# Patient Record
Sex: MALE | Employment: OTHER | URBAN - METROPOLITAN AREA
[De-identification: names, ages, dates, MRNs, and addresses within clinical notes are randomized per-mention and may not be internally consistent; named-entity substitution may affect disease eponyms.]

---

## 2020-08-27 ENCOUNTER — ANESTHESIA (OUTPATIENT)
Dept: SURGERY | Facility: CLINIC | Age: 60
End: 2020-08-27
Payer: MEDICAID

## 2020-08-27 ENCOUNTER — ANESTHESIA EVENT (OUTPATIENT)
Dept: SURGERY | Facility: CLINIC | Age: 60
End: 2020-08-27
Payer: MEDICAID

## 2020-08-27 ENCOUNTER — APPOINTMENT (OUTPATIENT)
Dept: CT IMAGING | Facility: CLINIC | Age: 60
End: 2020-08-27
Attending: EMERGENCY MEDICINE
Payer: MEDICAID

## 2020-08-27 ENCOUNTER — APPOINTMENT (OUTPATIENT)
Dept: GENERAL RADIOLOGY | Facility: CLINIC | Age: 60
End: 2020-08-27
Attending: INTERNAL MEDICINE
Payer: MEDICAID

## 2020-08-27 ENCOUNTER — APPOINTMENT (OUTPATIENT)
Dept: GENERAL RADIOLOGY | Facility: CLINIC | Age: 60
End: 2020-08-27
Attending: EMERGENCY MEDICINE
Payer: MEDICAID

## 2020-08-27 ENCOUNTER — HOSPITAL ENCOUNTER (INPATIENT)
Facility: CLINIC | Age: 60
LOS: 1 days | Discharge: HOME | End: 2020-08-28
Attending: EMERGENCY MEDICINE | Admitting: INTERNAL MEDICINE
Payer: MEDICAID

## 2020-08-27 DIAGNOSIS — N20.0 KIDNEY STONE: ICD-10-CM

## 2020-08-27 DIAGNOSIS — N12 PYELONEPHRITIS: ICD-10-CM

## 2020-08-27 PROBLEM — A41.9 SEPSIS (H): Status: ACTIVE | Noted: 2020-08-27

## 2020-08-27 LAB
ALBUMIN UR-MCNC: 10 MG/DL
ANION GAP SERPL CALCULATED.3IONS-SCNC: 4 MMOL/L (ref 3–14)
APPEARANCE UR: CLEAR
BACTERIA #/AREA URNS HPF: ABNORMAL /HPF
BASOPHILS # BLD AUTO: 0 10E9/L (ref 0–0.2)
BASOPHILS NFR BLD AUTO: 0.1 %
BILIRUB UR QL STRIP: NEGATIVE
BUN SERPL-MCNC: 20 MG/DL (ref 7–30)
CALCIUM SERPL-MCNC: 8.6 MG/DL (ref 8.5–10.1)
CHLORIDE SERPL-SCNC: 111 MMOL/L (ref 94–109)
CO2 SERPL-SCNC: 25 MMOL/L (ref 20–32)
COLOR UR AUTO: YELLOW
CREAT SERPL-MCNC: 1.29 MG/DL (ref 0.66–1.25)
DIFFERENTIAL METHOD BLD: ABNORMAL
EOSINOPHIL # BLD AUTO: 0 10E9/L (ref 0–0.7)
EOSINOPHIL NFR BLD AUTO: 0.2 %
ERYTHROCYTE [DISTWIDTH] IN BLOOD BY AUTOMATED COUNT: 13.9 % (ref 10–15)
GFR SERPL CREATININE-BSD FRML MDRD: 60 ML/MIN/{1.73_M2}
GLUCOSE BLDC GLUCOMTR-MCNC: 222 MG/DL (ref 70–99)
GLUCOSE BLDC GLUCOMTR-MCNC: 292 MG/DL (ref 70–99)
GLUCOSE BLDC GLUCOMTR-MCNC: 88 MG/DL (ref 70–99)
GLUCOSE SERPL-MCNC: 145 MG/DL (ref 70–99)
GLUCOSE UR STRIP-MCNC: NEGATIVE MG/DL
HBA1C MFR BLD: 6.4 % (ref 0–5.6)
HCT VFR BLD AUTO: 42.8 % (ref 40–53)
HGB BLD-MCNC: 14.3 G/DL (ref 13.3–17.7)
HGB UR QL STRIP: ABNORMAL
IMM GRANULOCYTES # BLD: 0 10E9/L (ref 0–0.4)
IMM GRANULOCYTES NFR BLD: 0.2 %
KETONES UR STRIP-MCNC: NEGATIVE MG/DL
LABORATORY COMMENT REPORT: NORMAL
LACTATE BLD-SCNC: 1.1 MMOL/L (ref 0.7–2)
LEUKOCYTE ESTERASE UR QL STRIP: ABNORMAL
LYMPHOCYTES # BLD AUTO: 0.5 10E9/L (ref 0.8–5.3)
LYMPHOCYTES NFR BLD AUTO: 5.9 %
MCH RBC QN AUTO: 31.9 PG (ref 26.5–33)
MCHC RBC AUTO-ENTMCNC: 33.4 G/DL (ref 31.5–36.5)
MCV RBC AUTO: 96 FL (ref 78–100)
MONOCYTES # BLD AUTO: 0.4 10E9/L (ref 0–1.3)
MONOCYTES NFR BLD AUTO: 4.5 %
NEUTROPHILS # BLD AUTO: 7.2 10E9/L (ref 1.6–8.3)
NEUTROPHILS NFR BLD AUTO: 89.1 %
NITRATE UR QL: NEGATIVE
NT-PROBNP SERPL-MCNC: 410 PG/ML (ref 0–900)
PH UR STRIP: 5.5 PH (ref 5–7)
PLATELET # BLD AUTO: 145 10E9/L (ref 150–450)
POTASSIUM SERPL-SCNC: 3.8 MMOL/L (ref 3.4–5.3)
RBC # BLD AUTO: 4.48 10E12/L (ref 4.4–5.9)
RBC #/AREA URNS AUTO: 10 /HPF (ref 0–2)
SARS-COV-2 RNA SPEC QL NAA+PROBE: NEGATIVE
SARS-COV-2 RNA SPEC QL NAA+PROBE: NORMAL
SODIUM SERPL-SCNC: 140 MMOL/L (ref 133–144)
SOURCE: ABNORMAL
SP GR UR STRIP: 1.02 (ref 1–1.03)
SPECIMEN SOURCE: NORMAL
SPECIMEN SOURCE: NORMAL
SQUAMOUS #/AREA URNS AUTO: <1 /HPF (ref 0–1)
UROBILINOGEN UR STRIP-MCNC: NORMAL MG/DL (ref 0–2)
WBC # BLD AUTO: 8.1 10E9/L (ref 4–11)
WBC #/AREA URNS AUTO: 29 /HPF (ref 0–5)

## 2020-08-27 PROCEDURE — 96375 TX/PRO/DX INJ NEW DRUG ADDON: CPT

## 2020-08-27 PROCEDURE — 00000146 ZZHCL STATISTIC GLUCOSE BY METER IP

## 2020-08-27 PROCEDURE — 99222 1ST HOSP IP/OBS MODERATE 55: CPT | Mod: AI | Performed by: INTERNAL MEDICINE

## 2020-08-27 PROCEDURE — 25000125 ZZHC RX 250: Performed by: NURSE ANESTHETIST, CERTIFIED REGISTERED

## 2020-08-27 PROCEDURE — 37000009 ZZH ANESTHESIA TECHNICAL FEE, EACH ADDTL 15 MIN: Performed by: UROLOGY

## 2020-08-27 PROCEDURE — U0003 INFECTIOUS AGENT DETECTION BY NUCLEIC ACID (DNA OR RNA); SEVERE ACUTE RESPIRATORY SYNDROME CORONAVIRUS 2 (SARS-COV-2) (CORONAVIRUS DISEASE [COVID-19]), AMPLIFIED PROBE TECHNIQUE, MAKING USE OF HIGH THROUGHPUT TECHNOLOGIES AS DESCRIBED BY CMS-2020-01-R: HCPCS | Performed by: EMERGENCY MEDICINE

## 2020-08-27 PROCEDURE — 25000128 H RX IP 250 OP 636: Performed by: NURSE ANESTHETIST, CERTIFIED REGISTERED

## 2020-08-27 PROCEDURE — 27210794 ZZH OR GENERAL SUPPLY STERILE: Performed by: UROLOGY

## 2020-08-27 PROCEDURE — 25800030 ZZH RX IP 258 OP 636: Performed by: EMERGENCY MEDICINE

## 2020-08-27 PROCEDURE — 83880 ASSAY OF NATRIURETIC PEPTIDE: CPT | Performed by: NURSE PRACTITIONER

## 2020-08-27 PROCEDURE — 96365 THER/PROPH/DIAG IV INF INIT: CPT

## 2020-08-27 PROCEDURE — 25000125 ZZHC RX 250: Performed by: SURGERY

## 2020-08-27 PROCEDURE — 87186 SC STD MICRODIL/AGAR DIL: CPT | Performed by: EMERGENCY MEDICINE

## 2020-08-27 PROCEDURE — 87088 URINE BACTERIA CULTURE: CPT | Performed by: EMERGENCY MEDICINE

## 2020-08-27 PROCEDURE — 74176 CT ABD & PELVIS W/O CONTRAST: CPT

## 2020-08-27 PROCEDURE — 25000125 ZZHC RX 250: Performed by: UROLOGY

## 2020-08-27 PROCEDURE — 0TF78ZZ FRAGMENTATION IN LEFT URETER, VIA NATURAL OR ARTIFICIAL OPENING ENDOSCOPIC: ICD-10-PCS | Performed by: UROLOGY

## 2020-08-27 PROCEDURE — 85025 COMPLETE CBC W/AUTO DIFF WBC: CPT | Performed by: EMERGENCY MEDICINE

## 2020-08-27 PROCEDURE — 25000131 ZZH RX MED GY IP 250 OP 636 PS 637: Performed by: INTERNAL MEDICINE

## 2020-08-27 PROCEDURE — 12000011 ZZH R&B MS OVERFLOW

## 2020-08-27 PROCEDURE — 0T778DZ DILATION OF LEFT URETER WITH INTRALUMINAL DEVICE, VIA NATURAL OR ARTIFICIAL OPENING ENDOSCOPIC: ICD-10-PCS | Performed by: UROLOGY

## 2020-08-27 PROCEDURE — 36000058 ZZH SURGERY LEVEL 3 EA 15 ADDTL MIN: Performed by: UROLOGY

## 2020-08-27 PROCEDURE — 87086 URINE CULTURE/COLONY COUNT: CPT | Performed by: EMERGENCY MEDICINE

## 2020-08-27 PROCEDURE — 25800030 ZZH RX IP 258 OP 636: Performed by: SURGERY

## 2020-08-27 PROCEDURE — 83036 HEMOGLOBIN GLYCOSYLATED A1C: CPT | Performed by: EMERGENCY MEDICINE

## 2020-08-27 PROCEDURE — 25000128 H RX IP 250 OP 636: Performed by: EMERGENCY MEDICINE

## 2020-08-27 PROCEDURE — 80048 BASIC METABOLIC PNL TOTAL CA: CPT | Performed by: EMERGENCY MEDICINE

## 2020-08-27 PROCEDURE — 71000012 ZZH RECOVERY PHASE 1 LEVEL 1 FIRST HR: Performed by: UROLOGY

## 2020-08-27 PROCEDURE — 40000170 ZZH STATISTIC PRE-PROCEDURE ASSESSMENT II: Performed by: UROLOGY

## 2020-08-27 PROCEDURE — 25000132 ZZH RX MED GY IP 250 OP 250 PS 637: Performed by: INTERNAL MEDICINE

## 2020-08-27 PROCEDURE — C1769 GUIDE WIRE: HCPCS | Performed by: UROLOGY

## 2020-08-27 PROCEDURE — 71045 X-RAY EXAM CHEST 1 VIEW: CPT | Mod: 77

## 2020-08-27 PROCEDURE — 71045 X-RAY EXAM CHEST 1 VIEW: CPT

## 2020-08-27 PROCEDURE — C1758 CATHETER, URETERAL: HCPCS | Performed by: UROLOGY

## 2020-08-27 PROCEDURE — 25000128 H RX IP 250 OP 636: Performed by: INTERNAL MEDICINE

## 2020-08-27 PROCEDURE — 71000013 ZZH RECOVERY PHASE 1 LEVEL 1 EA ADDTL HR: Performed by: UROLOGY

## 2020-08-27 PROCEDURE — C2617 STENT, NON-COR, TEM W/O DEL: HCPCS | Performed by: UROLOGY

## 2020-08-27 PROCEDURE — 37000008 ZZH ANESTHESIA TECHNICAL FEE, 1ST 30 MIN: Performed by: UROLOGY

## 2020-08-27 PROCEDURE — 96361 HYDRATE IV INFUSION ADD-ON: CPT

## 2020-08-27 PROCEDURE — 40000277 XR SURGERY CARM FLUORO LESS THAN 5 MIN W STILLS

## 2020-08-27 PROCEDURE — 25000132 ZZH RX MED GY IP 250 OP 250 PS 637: Performed by: EMERGENCY MEDICINE

## 2020-08-27 PROCEDURE — 99285 EMERGENCY DEPT VISIT HI MDM: CPT | Mod: 25

## 2020-08-27 PROCEDURE — 36000056 ZZH SURGERY LEVEL 3 1ST 30 MIN: Performed by: UROLOGY

## 2020-08-27 PROCEDURE — 81001 URINALYSIS AUTO W/SCOPE: CPT | Performed by: EMERGENCY MEDICINE

## 2020-08-27 PROCEDURE — 83605 ASSAY OF LACTIC ACID: CPT | Performed by: UROLOGY

## 2020-08-27 PROCEDURE — C9803 HOPD COVID-19 SPEC COLLECT: HCPCS

## 2020-08-27 PROCEDURE — 25000566 ZZH SEVOFLURANE, EA 15 MIN: Performed by: UROLOGY

## 2020-08-27 DEVICE — URETERAL STENT
Type: IMPLANTABLE DEVICE | Site: URETER | Status: FUNCTIONAL
Brand: PERCUFLEX™ PLUS

## 2020-08-27 RX ORDER — ACETAMINOPHEN 325 MG/1
650 TABLET ORAL EVERY 4 HOURS PRN
Status: DISCONTINUED | OUTPATIENT
Start: 2020-08-27 | End: 2020-08-28 | Stop reason: HOSPADM

## 2020-08-27 RX ORDER — ACETAMINOPHEN 650 MG/1
650 SUPPOSITORY RECTAL EVERY 4 HOURS PRN
Status: DISCONTINUED | OUTPATIENT
Start: 2020-08-27 | End: 2020-08-28 | Stop reason: HOSPADM

## 2020-08-27 RX ORDER — POTASSIUM CL/LIDO/0.9 % NACL 10MEQ/0.1L
10 INTRAVENOUS SOLUTION, PIGGYBACK (ML) INTRAVENOUS
Status: DISCONTINUED | OUTPATIENT
Start: 2020-08-27 | End: 2020-08-28 | Stop reason: HOSPADM

## 2020-08-27 RX ORDER — HYDROMORPHONE HYDROCHLORIDE 1 MG/ML
0.5 INJECTION, SOLUTION INTRAMUSCULAR; INTRAVENOUS; SUBCUTANEOUS
Status: DISCONTINUED | OUTPATIENT
Start: 2020-08-27 | End: 2020-08-27

## 2020-08-27 RX ORDER — IPRATROPIUM BROMIDE AND ALBUTEROL SULFATE 2.5; .5 MG/3ML; MG/3ML
3 SOLUTION RESPIRATORY (INHALATION) ONCE
Status: COMPLETED | OUTPATIENT
Start: 2020-08-27 | End: 2020-08-27

## 2020-08-27 RX ORDER — CEFTRIAXONE 2 G/1
2 INJECTION, POWDER, FOR SOLUTION INTRAMUSCULAR; INTRAVENOUS EVERY 24 HOURS
Status: DISCONTINUED | OUTPATIENT
Start: 2020-08-28 | End: 2020-08-27

## 2020-08-27 RX ORDER — POLYETHYLENE GLYCOL 3350 17 G/17G
17 POWDER, FOR SOLUTION ORAL DAILY PRN
Status: DISCONTINUED | OUTPATIENT
Start: 2020-08-27 | End: 2020-08-28 | Stop reason: HOSPADM

## 2020-08-27 RX ORDER — POTASSIUM CHLORIDE 29.8 MG/ML
20 INJECTION INTRAVENOUS
Status: DISCONTINUED | OUTPATIENT
Start: 2020-08-27 | End: 2020-08-28 | Stop reason: HOSPADM

## 2020-08-27 RX ORDER — FENTANYL CITRATE 50 UG/ML
INJECTION, SOLUTION INTRAMUSCULAR; INTRAVENOUS PRN
Status: DISCONTINUED | OUTPATIENT
Start: 2020-08-27 | End: 2020-08-27

## 2020-08-27 RX ORDER — FENTANYL CITRATE 50 UG/ML
25-50 INJECTION, SOLUTION INTRAMUSCULAR; INTRAVENOUS
Status: CANCELLED | OUTPATIENT
Start: 2020-08-27

## 2020-08-27 RX ORDER — CEFTRIAXONE 1 G/1
1 INJECTION, POWDER, FOR SOLUTION INTRAMUSCULAR; INTRAVENOUS ONCE
Status: COMPLETED | OUTPATIENT
Start: 2020-08-27 | End: 2020-08-27

## 2020-08-27 RX ORDER — AMOXICILLIN 250 MG
2 CAPSULE ORAL 2 TIMES DAILY PRN
Status: DISCONTINUED | OUTPATIENT
Start: 2020-08-27 | End: 2020-08-28 | Stop reason: HOSPADM

## 2020-08-27 RX ORDER — ONDANSETRON 2 MG/ML
4 INJECTION INTRAMUSCULAR; INTRAVENOUS EVERY 6 HOURS PRN
Status: DISCONTINUED | OUTPATIENT
Start: 2020-08-27 | End: 2020-08-28 | Stop reason: HOSPADM

## 2020-08-27 RX ORDER — KETOROLAC TROMETHAMINE 15 MG/ML
15 INJECTION, SOLUTION INTRAMUSCULAR; INTRAVENOUS ONCE
Status: COMPLETED | OUTPATIENT
Start: 2020-08-27 | End: 2020-08-27

## 2020-08-27 RX ORDER — HYDROMORPHONE HYDROCHLORIDE 1 MG/ML
.3-.5 INJECTION, SOLUTION INTRAMUSCULAR; INTRAVENOUS; SUBCUTANEOUS
Status: DISCONTINUED | OUTPATIENT
Start: 2020-08-27 | End: 2020-08-28 | Stop reason: HOSPADM

## 2020-08-27 RX ORDER — ONDANSETRON 4 MG/1
4 TABLET, ORALLY DISINTEGRATING ORAL EVERY 30 MIN PRN
Status: CANCELLED | OUTPATIENT
Start: 2020-08-27

## 2020-08-27 RX ORDER — NICOTINE POLACRILEX 4 MG
15-30 LOZENGE BUCCAL
Status: DISCONTINUED | OUTPATIENT
Start: 2020-08-27 | End: 2020-08-28 | Stop reason: HOSPADM

## 2020-08-27 RX ORDER — ATROPA BELLADONNA AND OPIUM 16.2; 3 MG/1; MG/1
SUPPOSITORY RECTAL PRN
Status: DISCONTINUED | OUTPATIENT
Start: 2020-08-27 | End: 2020-08-27 | Stop reason: HOSPADM

## 2020-08-27 RX ORDER — NALOXONE HYDROCHLORIDE 0.4 MG/ML
.1-.4 INJECTION, SOLUTION INTRAMUSCULAR; INTRAVENOUS; SUBCUTANEOUS
Status: CANCELLED | OUTPATIENT
Start: 2020-08-27 | End: 2020-08-28

## 2020-08-27 RX ORDER — SODIUM CHLORIDE, SODIUM LACTATE, POTASSIUM CHLORIDE, CALCIUM CHLORIDE 600; 310; 30; 20 MG/100ML; MG/100ML; MG/100ML; MG/100ML
INJECTION, SOLUTION INTRAVENOUS CONTINUOUS
Status: CANCELLED | OUTPATIENT
Start: 2020-08-27

## 2020-08-27 RX ORDER — OXYCODONE HYDROCHLORIDE 5 MG/1
5-10 TABLET ORAL
Status: DISCONTINUED | OUTPATIENT
Start: 2020-08-27 | End: 2020-08-28 | Stop reason: HOSPADM

## 2020-08-27 RX ORDER — AMOXICILLIN 250 MG
1 CAPSULE ORAL 2 TIMES DAILY PRN
Status: DISCONTINUED | OUTPATIENT
Start: 2020-08-27 | End: 2020-08-28 | Stop reason: HOSPADM

## 2020-08-27 RX ORDER — LEVOFLOXACIN 5 MG/ML
500 INJECTION, SOLUTION INTRAVENOUS EVERY 24 HOURS
Status: DISCONTINUED | OUTPATIENT
Start: 2020-08-27 | End: 2020-08-27

## 2020-08-27 RX ORDER — DEXTROSE MONOHYDRATE 25 G/50ML
25-50 INJECTION, SOLUTION INTRAVENOUS
Status: DISCONTINUED | OUTPATIENT
Start: 2020-08-27 | End: 2020-08-28 | Stop reason: HOSPADM

## 2020-08-27 RX ORDER — SODIUM CHLORIDE, SODIUM LACTATE, POTASSIUM CHLORIDE, CALCIUM CHLORIDE 600; 310; 30; 20 MG/100ML; MG/100ML; MG/100ML; MG/100ML
INJECTION, SOLUTION INTRAVENOUS CONTINUOUS
Status: DISCONTINUED | OUTPATIENT
Start: 2020-08-27 | End: 2020-08-27 | Stop reason: HOSPADM

## 2020-08-27 RX ORDER — HYDROMORPHONE HYDROCHLORIDE 1 MG/ML
.3-.5 INJECTION, SOLUTION INTRAMUSCULAR; INTRAVENOUS; SUBCUTANEOUS EVERY 5 MIN PRN
Status: CANCELLED | OUTPATIENT
Start: 2020-08-27

## 2020-08-27 RX ORDER — ACETAMINOPHEN 325 MG/1
650 TABLET ORAL ONCE
Status: COMPLETED | OUTPATIENT
Start: 2020-08-27 | End: 2020-08-27

## 2020-08-27 RX ORDER — SODIUM CHLORIDE 9 MG/ML
INJECTION, SOLUTION INTRAVENOUS CONTINUOUS
Status: DISCONTINUED | OUTPATIENT
Start: 2020-08-27 | End: 2020-08-27

## 2020-08-27 RX ORDER — LIDOCAINE HYDROCHLORIDE 20 MG/ML
INJECTION, SOLUTION INFILTRATION; PERINEURAL PRN
Status: DISCONTINUED | OUTPATIENT
Start: 2020-08-27 | End: 2020-08-27

## 2020-08-27 RX ORDER — NALOXONE HYDROCHLORIDE 0.4 MG/ML
.1-.4 INJECTION, SOLUTION INTRAMUSCULAR; INTRAVENOUS; SUBCUTANEOUS
Status: DISCONTINUED | OUTPATIENT
Start: 2020-08-27 | End: 2020-08-28 | Stop reason: HOSPADM

## 2020-08-27 RX ORDER — POTASSIUM CHLORIDE 7.45 MG/ML
10 INJECTION INTRAVENOUS
Status: DISCONTINUED | OUTPATIENT
Start: 2020-08-27 | End: 2020-08-28 | Stop reason: HOSPADM

## 2020-08-27 RX ORDER — SODIUM CHLORIDE AND POTASSIUM CHLORIDE 150; 900 MG/100ML; MG/100ML
INJECTION, SOLUTION INTRAVENOUS CONTINUOUS
Status: DISCONTINUED | OUTPATIENT
Start: 2020-08-27 | End: 2020-08-28 | Stop reason: HOSPADM

## 2020-08-27 RX ORDER — ONDANSETRON 2 MG/ML
4 INJECTION INTRAMUSCULAR; INTRAVENOUS EVERY 30 MIN PRN
Status: DISCONTINUED | OUTPATIENT
Start: 2020-08-27 | End: 2020-08-27

## 2020-08-27 RX ORDER — BISACODYL 10 MG
10 SUPPOSITORY, RECTAL RECTAL DAILY PRN
Status: DISCONTINUED | OUTPATIENT
Start: 2020-08-27 | End: 2020-08-28 | Stop reason: HOSPADM

## 2020-08-27 RX ORDER — DEXAMETHASONE SODIUM PHOSPHATE 4 MG/ML
INJECTION, SOLUTION INTRA-ARTICULAR; INTRALESIONAL; INTRAMUSCULAR; INTRAVENOUS; SOFT TISSUE PRN
Status: DISCONTINUED | OUTPATIENT
Start: 2020-08-27 | End: 2020-08-27

## 2020-08-27 RX ORDER — ONDANSETRON 2 MG/ML
4 INJECTION INTRAMUSCULAR; INTRAVENOUS EVERY 30 MIN PRN
Status: CANCELLED | OUTPATIENT
Start: 2020-08-27

## 2020-08-27 RX ORDER — POTASSIUM CHLORIDE 1500 MG/1
20-40 TABLET, EXTENDED RELEASE ORAL
Status: DISCONTINUED | OUTPATIENT
Start: 2020-08-27 | End: 2020-08-28 | Stop reason: HOSPADM

## 2020-08-27 RX ORDER — PROPOFOL 10 MG/ML
INJECTION, EMULSION INTRAVENOUS PRN
Status: DISCONTINUED | OUTPATIENT
Start: 2020-08-27 | End: 2020-08-27

## 2020-08-27 RX ORDER — ONDANSETRON 4 MG/1
4 TABLET, ORALLY DISINTEGRATING ORAL EVERY 6 HOURS PRN
Status: DISCONTINUED | OUTPATIENT
Start: 2020-08-27 | End: 2020-08-28 | Stop reason: HOSPADM

## 2020-08-27 RX ORDER — POTASSIUM CHLORIDE 1.5 G/1.58G
20-40 POWDER, FOR SOLUTION ORAL
Status: DISCONTINUED | OUTPATIENT
Start: 2020-08-27 | End: 2020-08-28 | Stop reason: HOSPADM

## 2020-08-27 RX ORDER — PIPERACILLIN SODIUM, TAZOBACTAM SODIUM 4; .5 G/20ML; G/20ML
4.5 INJECTION, POWDER, LYOPHILIZED, FOR SOLUTION INTRAVENOUS EVERY 6 HOURS
Status: DISCONTINUED | OUTPATIENT
Start: 2020-08-27 | End: 2020-08-28 | Stop reason: HOSPADM

## 2020-08-27 RX ADMIN — SODIUM CHLORIDE, POTASSIUM CHLORIDE, SODIUM LACTATE AND CALCIUM CHLORIDE: 600; 310; 30; 20 INJECTION, SOLUTION INTRAVENOUS at 16:10

## 2020-08-27 RX ADMIN — HYDROMORPHONE HYDROCHLORIDE 0.5 MG: 1 INJECTION, SOLUTION INTRAMUSCULAR; INTRAVENOUS; SUBCUTANEOUS at 06:30

## 2020-08-27 RX ADMIN — IPRATROPIUM BROMIDE AND ALBUTEROL SULFATE 3 ML: .5; 3 SOLUTION RESPIRATORY (INHALATION) at 16:55

## 2020-08-27 RX ADMIN — CEFTRIAXONE SODIUM 1 G: 1 INJECTION, POWDER, FOR SOLUTION INTRAMUSCULAR; INTRAVENOUS at 09:04

## 2020-08-27 RX ADMIN — ROCURONIUM BROMIDE 50 MG: 10 INJECTION INTRAVENOUS at 15:03

## 2020-08-27 RX ADMIN — POTASSIUM CHLORIDE AND SODIUM CHLORIDE: 900; 150 INJECTION, SOLUTION INTRAVENOUS at 19:32

## 2020-08-27 RX ADMIN — SODIUM CHLORIDE 1000 ML: 9 INJECTION, SOLUTION INTRAVENOUS at 06:29

## 2020-08-27 RX ADMIN — FENTANYL CITRATE 50 MCG: 50 INJECTION, SOLUTION INTRAMUSCULAR; INTRAVENOUS at 15:02

## 2020-08-27 RX ADMIN — DEXAMETHASONE SODIUM PHOSPHATE 10 MG: 4 INJECTION, SOLUTION INTRA-ARTICULAR; INTRALESIONAL; INTRAMUSCULAR; INTRAVENOUS; SOFT TISSUE at 15:16

## 2020-08-27 RX ADMIN — PROPOFOL 40 MG: 10 INJECTION, EMULSION INTRAVENOUS at 15:48

## 2020-08-27 RX ADMIN — LIDOCAINE HYDROCHLORIDE 80 MG: 20 INJECTION, SOLUTION INFILTRATION; PERINEURAL at 15:02

## 2020-08-27 RX ADMIN — ACETAMINOPHEN 650 MG: 650 SUPPOSITORY RECTAL at 17:35

## 2020-08-27 RX ADMIN — PROPOFOL 160 MG: 10 INJECTION, EMULSION INTRAVENOUS at 15:02

## 2020-08-27 RX ADMIN — CEFTRIAXONE SODIUM 1 G: 1 INJECTION, POWDER, FOR SOLUTION INTRAMUSCULAR; INTRAVENOUS at 14:04

## 2020-08-27 RX ADMIN — INSULIN ASPART 1 UNITS: 100 INJECTION, SOLUTION INTRAVENOUS; SUBCUTANEOUS at 22:36

## 2020-08-27 RX ADMIN — PIPERACILLIN SODIUM AND TAZOBACTAM SODIUM 4.5 G: 4; .5 INJECTION, POWDER, LYOPHILIZED, FOR SOLUTION INTRAVENOUS at 19:31

## 2020-08-27 RX ADMIN — SUGAMMADEX 200 MG: 100 INJECTION, SOLUTION INTRAVENOUS at 15:57

## 2020-08-27 RX ADMIN — POTASSIUM CHLORIDE AND SODIUM CHLORIDE: 900; 150 INJECTION, SOLUTION INTRAVENOUS at 12:13

## 2020-08-27 RX ADMIN — KETOROLAC TROMETHAMINE 15 MG: 15 INJECTION, SOLUTION INTRAMUSCULAR; INTRAVENOUS at 06:30

## 2020-08-27 RX ADMIN — FENTANYL CITRATE 50 MCG: 50 INJECTION, SOLUTION INTRAMUSCULAR; INTRAVENOUS at 15:22

## 2020-08-27 RX ADMIN — MIDAZOLAM 2 MG: 1 INJECTION INTRAMUSCULAR; INTRAVENOUS at 14:57

## 2020-08-27 RX ADMIN — ACETAMINOPHEN 650 MG: 325 TABLET, FILM COATED ORAL at 06:29

## 2020-08-27 RX ADMIN — SODIUM CHLORIDE, POTASSIUM CHLORIDE, SODIUM LACTATE AND CALCIUM CHLORIDE: 600; 310; 30; 20 INJECTION, SOLUTION INTRAVENOUS at 14:37

## 2020-08-27 RX ADMIN — HYDROMORPHONE HYDROCHLORIDE 0.3 MG: 1 INJECTION, SOLUTION INTRAMUSCULAR; INTRAVENOUS; SUBCUTANEOUS at 12:43

## 2020-08-27 RX ADMIN — ONDANSETRON 4 MG: 2 INJECTION INTRAMUSCULAR; INTRAVENOUS at 06:30

## 2020-08-27 ASSESSMENT — ACTIVITIES OF DAILY LIVING (ADL): ADLS_ACUITY_SCORE: 17

## 2020-08-27 ASSESSMENT — MIFFLIN-ST. JEOR: SCORE: 1868.62

## 2020-08-27 ASSESSMENT — ENCOUNTER SYMPTOMS
NAUSEA: 1
DIARRHEA: 0
ABDOMINAL PAIN: 0
HEMATURIA: 0
DYSURIA: 1
CHILLS: 1
FEVER: 1
FREQUENCY: 1
FLANK PAIN: 1

## 2020-08-27 NOTE — ED NOTES
"[facility]  ED Nurse Handoff Report    ED Chief complaint: Flank Pain      ED Diagnosis:   Final diagnoses:   Kidney stone   Pyelonephritis       Code Status: not addressed    Allergies: No Known Allergies    Patient Story: Madelyn Jackson is a 59 year old male with a history of kidney stones who presents for the evaluation of flank pain. The patient reports that over the past two months he has been experiencing intermittent bilateral flank pain and that for the past day his pain has been unbearable, prompting him to the ED. The patient notes that when his flank pain initially started, he passed a kidney stone and was also experiencing nausea with vomiting. Patient endorses nausea currently, but denies vomiting. He states that his previous kidney stones have passed on their own without surgical intervention. Patient also endorses fever and chills over the past few days with increased frequency of urination and dysuria. The patient denies diarrhea, hematuria, chest pain, abdominal pain, and other issues.      Focused Assess  Treatments and/or interventions provided: ZOFAN, TORADOL DILAUDID ROCEPHIN TYLENOL, 1 LITER NS   Patient's response to treatments and/or interventions: PAIN FREE CURRENTLY     To be done/followed up on inpatient unit    Does this patient have any cognitive concerns?: NO    Activity level - Baseline/Home:  Independent  Activity Level - Current:   Unknown    Patient's Preferred language: Data Unavailable   Needed?: Yes    Isolation: None and Other: COVID   Infection: COVID RULE OUT   Other   Bariatric?: No    Vital Signs:   Vitals:    08/27/20 0535   BP: 121/72   Pulse: 110   Temp: 100.2  F (37.9  C)   TempSrc: Temporal   SpO2: 94%   Weight: 103 kg (227 lb)   Height: 1.798 m (5' 10.8\")       Cardiac Rhythm:     Was the PSS-3 completed:   Yes  What interventions are required if any?               Family Comments: BROTHER HERE   OBS brochure/video discussed/provided " to patient/family: No              Name of person given brochure if not patient: NA              Relationship to patient: NA    For the majority of the shift this patient's behavior was Green.   Behavioral interventions performed were na.    ED NURSE PHONE NUMBER: [phone]

## 2020-08-27 NOTE — CONSULTS
[facility]    Urology Consultation     Date of Admission:  8/27/2020    Assessment & Plan   Madelyn Jackson is a 59 year old Romansh-speaking male who was admitted on 8/27/2020. I was asked to see the patient for 9mm obstructing left UVJ stone and hydronephrosis. Tmax 100.2. All other vitals stable. WBC 8. Creat. 1.29. No Hx of stones. Recently passed a right-sided stone.    Plan:     Admit to medicine    Culture specific ABx per IM    NPO    Given febrile picture and month-long Hx of flank pain, patient should have urgent relief of ureteral obstruction. This can be done by either left ureteral stent placement or left nephrostomy tube placement.     Will need rapid COVID testing: Discussed with lab who expects results to be back by 1pm today.    Discussed with Dr. Echeverria, who is available to place left stent at 2 or 3pm today.    Discussed with Lauren at the surgery control desk who will add case on    If OR is unable to accommodate a 2 or 3pm OR time, the patient will need a left nephrostomy tube today in IR.      Antoine Ma PA-C 8/27/2020 10:31 AM  [facility]  Mon-Fri, 7am - 4pm  Office: [phone]      Code Status    No Order    Reason for Consult   Reason for consult: Kidney stone    Primary Care Physician   No primary care provider on file.    Chief Complaint   Left flank pain, fevers, chills.    History is obtained from the patient and electronic health record. I am fluent in Romansh and took the patient's history without the use of an .     History of Present Illness   Madelyn Jackson is a 59 year old Romansh-speaking male with no medical Hx who presents with left flank pain fevers, and chills. He had bilateral flank pain, passed a stone about two weeks ago, and his right flank pain went away. The left sided pain persisted, however, and has gradually worsened until 3 days ago when he began to have fevers and chills. He finally presented to the ER  today where his temp was 100.2 UA showed blood, and moderate bacteria. He serum WBC was 8 and Creat was 1.29. He was given dilaudid which helped control his pain and was started on ceftriaxone. CT showed a 9mm left UVJ stone with mild hydronephrosis.     He denies any previous Hx of stones or surgery. Does not take any medications beyond a daily aspirin.     Past Medical History   I have reviewed this patient's medical history and updated it with pertinent information if needed.   No past medical history on file.    Past Surgical History   I have reviewed this patient's surgical history and updated it with pertinent information if needed.  No past surgical history on file.    Prior to Admission Medications   None     Allergies   No Known Allergies    Social History   I have reviewed this patient's social history and updated it with pertinent information if needed. Madelyn Jackson      Family History   I have reviewed this patient's family history and updated it with pertinent information if needed.   No family history on file.    Review of Systems   The 10 point Review of Systems is negative other than noted in the HPI or here.     Physical Exam   Temp: 100.2  F (37.9  C) Temp src: Temporal BP: 111/73 Pulse: 66     SpO2: 94 % O2 Device: None (Room air)    Vital Signs with Ranges  Temp:  [100.2  F (37.9  C)] 100.2  F (37.9  C)  Pulse:  [] 66  BP: (100-121)/(58-95) 111/73  SpO2:  [94 %-97 %] 94 %  227 lbs 0 oz    General: Patient awake, alert, NAD, sitting up in bed, Son at bedside.  Head: Normocephalic, atraumatic  Eyes: No icterus  Neck: Symmetric  Respiratory: Breathing unlabored  Cardiac: Skin well-perfused  GI: Soft, NT, non-distended, no SP tenderness, + left CVA tenderness  Skin: No visible rashes   Extremities: No LE edema, no calf pain  Neurologic: No focal deficits  Neuropsychiatric: A&O x 3, responding appropriately      Data   Results for orders placed or performed during the hospital  encounter of 08/27/20 (from the past 24 hour(s))   CBC with platelets differential   Result Value Ref Range    WBC 8.1 4.0 - 11.0 10e9/L    RBC Count 4.48 4.4 - 5.9 10e12/L    Hemoglobin 14.3 13.3 - 17.7 g/dL    Hematocrit 42.8 40.0 - 53.0 %    MCV 96 78 - 100 fl    MCH 31.9 26.5 - 33.0 pg    MCHC 33.4 31.5 - 36.5 g/dL    RDW 13.9 10.0 - 15.0 %    Platelet Count 145 (L) 150 - 450 10e9/L    Diff Method Automated Method     % Neutrophils 89.1 %    % Lymphocytes 5.9 %    % Monocytes 4.5 %    % Eosinophils 0.2 %    % Basophils 0.1 %    % Immature Granulocytes 0.2 %    Absolute Neutrophil 7.2 1.6 - 8.3 10e9/L    Absolute Lymphocytes 0.5 (L) 0.8 - 5.3 10e9/L    Absolute Monocytes 0.4 0.0 - 1.3 10e9/L    Absolute Eosinophils 0.0 0.0 - 0.7 10e9/L    Absolute Basophils 0.0 0.0 - 0.2 10e9/L    Abs Immature Granulocytes 0.0 0 - 0.4 10e9/L   Basic metabolic panel   Result Value Ref Range    Sodium 140 133 - 144 mmol/L    Potassium 3.8 3.4 - 5.3 mmol/L    Chloride 111 (H) 94 - 109 mmol/L    Carbon Dioxide 25 20 - 32 mmol/L    Anion Gap 4 3 - 14 mmol/L    Glucose 145 (H) 70 - 99 mg/dL    Urea Nitrogen 20 7 - 30 mg/dL    Creatinine 1.29 (H) 0.66 - 1.25 mg/dL    GFR Estimate 60 (L) >60 mL/min/[1.73_m2]    GFR Estimate If Black 70 >60 mL/min/[1.73_m2]    Calcium 8.6 8.5 - 10.1 mg/dL   CT Abdomen Pelvis without Contrast (stone protocol)    Narrative    CT ABDOMEN PELVIS W/O CONTRAST 8/27/2020 7:10 AM    CLINICAL HISTORY: Flank pain, recurrent stone disease suspected  TECHNIQUE: CT scan of the abdomen and pelvis was performed without IV  contrast. Multiplanar reformats were obtained. Dose reduction  techniques were used.  CONTRAST: None.    COMPARISON: None.    FINDINGS:   LOWER CHEST: There are some patchy groundglass opacities in the lower  lungs bilaterally. Calcified granuloma left lower lobe.    HEPATOBILIARY: Diffuse hepatic steatosis. Cholelithiasis.    PANCREAS: No acute peripancreatic inflammatory changes.    SPLEEN: Normal  size.    ADRENAL GLANDS: Normal.    KIDNEYS/BLADDER: The left kidney is enlarged and edematous. There is a  9 mm calculus at the left ureterovesical junction and an additional 3  mm calculus 1 cm proximal to the UVJ stone. There is mild-moderate  left hydroureteronephrosis. Tiny nonobstructing right intrarenal  calculi. Left renal cyst which does not require further imaging  evaluation. The bladder is negative.    BOWEL: The distal esophagus and stomach are normal in appearance.  Multiple duodenal diverticulum. No small bowel obstruction. Normal  appendix. Normal appearance to the colon.    LYMPH NODES: No abdominopelvic lymphadenopathy.    VASCULATURE: Unremarkable.    PELVIC ORGANS: Normal.    OTHER: None.    MUSCULOSKELETAL: Scattered skeletal degenerative changes.  Fat-containing left internal hernia.      Impression    IMPRESSION:   1.  Two obstructing distal left ureteral calculi, the larger measuring  9 mm at the UVJ with additional 3 mm calculus 1 cm proximal.  2.  Mild-moderate left hydroureteronephrosis.  3.  Patchy groundglass opacities are visualized in the lower lungs.  Imaging features can be seen with (COVID-19)  pneumonia, though are  nonspecific and can occur with a variety of infectious and  noninfectious processes.    LEA CH MD   UA with Microscopic reflex to Culture    Specimen: Midstream Urine   Result Value Ref Range    Color Urine Yellow     Appearance Urine Clear     Glucose Urine Negative NEG^Negative mg/dL    Bilirubin Urine Negative NEG^Negative    Ketones Urine Negative NEG^Negative mg/dL    Specific Gravity Urine 1.024 1.003 - 1.035    Blood Urine Small (A) NEG^Negative    pH Urine 5.5 5.0 - 7.0 pH    Protein Albumin Urine 10 (A) NEG^Negative mg/dL    Urobilinogen mg/dL Normal 0.0 - 2.0 mg/dL    Nitrite Urine Negative NEG^Negative    Leukocyte Esterase Urine Large (A) NEG^Negative    Source Midstream Urine     WBC Urine 29 (H) 0 - 5 /HPF    RBC Urine 10 (H) 0 - 2 /HPF     Bacteria Urine Moderate (A) NEG^Negative /HPF    Squamous Epithelial /HPF Urine <1 0 - 1 /HPF   XR Chest Port 1 View    Narrative    CHEST ONE VIEW PORTABLE  8/27/2020 9:25 AM     HISTORY:  Fever.    COMPARISON: None.      Impression    IMPRESSION: Portable chest. Lungs are clear. Linear scarring right  midlung is noted. Heart is normal in size. No pneumothorax. No  definite pleural effusions.    PATEL HARRINGTON MD

## 2020-08-27 NOTE — ED PROVIDER NOTES
History     Chief Complaint:  Flank Pain      HPI limited secondary due to the language barrier. HPI provided via Uzbek translation of the patient's son.   HPI   Madelyn Jackson is a 59 year old male with a history of kidney stones who presents for the evaluation of flank pain. The patient reports that over the past two months he has been experiencing intermittent bilateral flank pain and that for the past day his pain has been unbearable, prompting him to the ED. The patient notes that when his flank pain initially started, he passed a kidney stone and was also experiencing nausea with vomiting. Patient endorses nausea currently, but denies vomiting. He states that his previous kidney stones have passed on their own without surgical intervention. Patient also endorses fever and chills over the past few days with increased frequency of urination and dysuria. The patient denies diarrhea, hematuria, chest pain, abdominal pain, and other issues.      Allergies:  No Known Drug Allergies     Medications:    The patient is currently on no regular medications.     Past Medical History:    Kidney stones    Past Surgical History:    History reviewed. No pertinent surgical history.    Family History:    History reviewed. No pertinent family history.     Social History:  The patient was accompanied to the ED by son.  Smoking Status: Never smoker  Alcohol Use: No  Drug use: No  PCP: No primary care provider on file.   Marital Status: Not on file      Review of Systems   Constitutional: Positive for chills and fever.   Cardiovascular: Negative for chest pain.   Gastrointestinal: Positive for nausea. Negative for abdominal pain and diarrhea.   Genitourinary: Positive for dysuria, flank pain and frequency. Negative for hematuria.   All other systems reviewed and are negative.    Physical Exam     Patient Vitals for the past 24 hrs:   BP Temp Temp src Pulse SpO2 Height Weight   08/27/20 1015 111/73 -- -- 66 94 % -- --  "  08/27/20 1000 119/69 -- -- 71 94 % -- --   08/27/20 0945 (!) 111/95 -- -- 79 96 % -- --   08/27/20 0930 (!) 118/92 -- -- 73 95 % -- --   08/27/20 0920 100/64 -- -- -- 95 % -- --   08/27/20 0915 100/64 -- -- 68 97 % -- --   08/27/20 0910 116/63 -- -- -- 95 % -- --   08/27/20 0900 109/58 -- -- 75 95 % -- --   08/27/20 0535 121/72 100.2  F (37.9  C) Temporal 110 94 % 1.798 m (5' 10.8\") 103 kg (227 lb)      Physical Exam  Constitutional:  patient lying supine. Warm to touch.   HENT: No signs of trauma.   Eyes: EOM are normal. Pupils are equal, round, and reactive to light.   Neck: Normal range of motion. No JVD present. No cervical adenopathy.  Cardiovascular: Regular rhythm.  Exam reveals no gallop and no friction rub.    No murmur heard.  Pulmonary/Chest: Bilateral breath sounds normal. No wheezes, rhonchi or rales.  Abdominal: Soft. No tenderness. No rebound or guarding. 2+ femoral pulses.   : Bilateral descended testes. Uncircumcised penis.   Musculoskeletal: No edema. No tenderness.   Lymphadenopathy: No lymphadenopathy.   Neurological: Alert and oriented to person, place, and time. Normal strength. Coordination normal.   Skin: Skin is warm and dry. No rash noted. No erythema.  Left arm lesions related to skin cancer.     Emergency Department Course   Imaging:  Radiographic findings were communicated with the patient who voiced understanding of the findings.  CT Abdomen Pelvis without Contrast (stone protocol)   IMPRESSION:   1.  Two obstructing distal left ureteral calculi, the larger measuring  9 mm at the UVJ with additional 3 mm calculus 1 cm proximal.  2.  Mild-moderate left hydroureteronephrosis.  3.  Patchy groundglass opacities are visualized in the lower lungs.  Imaging features can be seen with (COVID-19)  pneumonia, though are  nonspecific and can occur with a variety of infectious and  noninfectious processes.  As read by Radiology.     XR Chest Portable 1 View  IMPRESSION: Portable chest. " Lungs are clear. Linear scarring right  midlung is noted. Heart is normal in size. No pneumothorax. No  definite pleural effusions.  As read by Radiology.     Laboratory:  BMP: Creatinine 1.29, Chloride 111, Glucose 145, GFR 60  CBC: WBC 8.1, HGB 14.3,     UA: Bloo Small, Protein Albumin 10, Leukocyte Esterase Large, WBC 29, RBC 10, Bacteria Moderate, o/w Negative   Urine culture: Pending    Symptomatic COVID-19 Virus (Coronavirus) by PCR Nasopharyngeal swab: Pending     Interventions:  0629, Tylenol, 650 mg, PO  0629, NS 1L IV Bolus  0630, Dilaudid, 0.5 mg, IV  0630, Zofran, 4 mg, IV  0630, Toradol, 15 mg, IV  0904, Rocephin, 1 g, IV    Emergency Department Course:  Past medical records, nursing notes, and vitals reviewed.  0609: I performed an exam of the patient and obtained history, as documented above.     IV inserted and blood drawn.     The patient was sent for an abdomen pelvis CT while in the emergency department, findings above.    The patient had a portable chest x-ray performed, findings above.     0857: I rechecked the patient. Explained findings to patient.     0905: I spoke with Antoine LOTT of urology.    Findings and plan explained to the Patient who consents to admission. Discussed the patient with Dr. Romero, who will admit the patient to a medical surgery bed for further monitoring, evaluation, and treatment.     Impression & Plan    Covid-19  Madelyn Jackson was evaluated during a global COVID-19 pandemic, which necessitated consideration that the patient might be at risk for infection with the SARS-CoV-2 virus that causes COVID-19.   Applicable protocols for evaluation were followed during the patient's care. COVID-19 was considered as part of the patient's evaluation. The plan for testing is: a test was obtained during this visit.     Medical Decision Making:  This is a 59 year old who presents with two months of flank pain with fever. The pain was so bad he could not sleep  last night and he came in with his son who translates. Patient does have a history of remote kidney stone, but had never required surgery for that. On exam, he is warm to touch, he has a fever, he has soft non tender abdomen. Work up includes blood, urine, and CT scan. CT shows a 9 mm obstructing stone at the left UVJ with hydro. His urine appears infected. Patient was given fluids, pain medicine, and IV antibiotics. I have contacted the urology and hospitalist who will plan to either remove the stone or perform and percutaneous nephrostomy. Patient is admitted for further evaluation and treatment.     Diagnosis:    ICD-10-CM    1. Kidney stone  N20.0 Symptomatic COVID-19 Virus (Coronavirus) by PCR   2. Pyelonephritis  N12        Disposition:  Admitted to a medical surgery bed.     Scribe Disclosure:  IKiran, am serving as a scribe at 6:09 AM on 8/27/2020 to document services personally performed by Tonny Rodriguez MD based on my observations and the provider's statements to me.      Kiran Olivas  8/27/2020    EMERGENCY DEPARTMENT       Tonny Rodriguez MD  08/27/20 4631

## 2020-08-27 NOTE — PROGRESS NOTES
"Called from PACU re: patient with fever of 105.7, HR 94, /75 and O2 99% on 6L via simple mask with wheezing and increased work of breathing.     Stat nebs being administered in OR / negative pressure room, and must wait 15 minutes after neb administration to enter room.     Follow up vitals.     /68   Pulse 98   Temp 101.7  F (38.7  C)   Resp 15   Ht 1.798 m (5' 10.8\")   Wt 103 kg (227 lb)   SpO2 95%   BMI 31.84 kg/m   on 3L.       A/P     Suspect fever secondary UTI, possible bacteremia (BCx obtained in ED).   Given wheezing and ground glass appearance on CT abd/pelvis also concerned about possible CHF/PNA and covid. Initial CXR in ER negative for infiltrates.   - Stat CXR ordered and will done 15 minutes following nebs when we can enter negative pressure room.   - I have changed his abx to zosyn for broader coverage of UTI.   - If new infiltrates on CXR, add azithromycin.   - If appearance of CHF, check BNP, give lasix 20 mg IV with f/u BMP and echo tomorrow.   - Continue COVID precautions for now given potential respiratory distress. If this continues would continue precautions with repeat test within 48 hours.     I have signed him out to house staff, Sana Stuart NP, to review his CXR and vitals and ensure no further action has to be taken.     Narcisa Romero MD        "

## 2020-08-27 NOTE — ANESTHESIA CARE TRANSFER NOTE
Patient: Madelyn Jackson    Procedure(s):  CYSTOSCOPY, LEFT RETROGRADE URETEROPYELOGRAM, LEFT RIGID URETEROSCOPE, HOLMIUM LASER LITHOTRIPSY OF URETERAL CALCULUS, AND LEFT URETER STENT INSERTION    Diagnosis: Kidney stones [N20.0]  Diagnosis Additional Information: No value filed.    Anesthesia Type:   General     Note:  Airway :Face Mask  Patient transferred to:PACU  Comments: Neuromuscular blockade reversed after TOF 4/4, spontaneous respirations, adequate tidal volumes, followed commands to voice, oropharynx suctioned with soft flexible catheter, extubated atraumatically, extubated with suction, airway patent after extubation.  Oxygen via facemask at 10 liters per minute to PACU. Oxygen tubing connected to wall O2 in PACU, SpO2, NiBP, and EKG monitors and alarms on and functioning, Stephen Hugger warmer connected to patient gown, report on patient's clinical status given to PACU RN, RN questions answered.   Handoff Report: Identifed the Patient, Identified the Reponsible Provider, Reviewed the pertinent medical history, Discussed the surgical course, Reviewed Intra-OP anesthesia mangement and issues during anesthesia, Set expectations for post-procedure period and Allowed opportunity for questions and acknowledgement of understanding      Vitals: (Last set prior to Anesthesia Care Transfer)    CRNA VITALS  8/27/2020 1543 - 8/27/2020 1626      8/27/2020             NIBP:  163/80    Pulse:  105    NIBP Mean:  99    Ht Rate:  106    SpO2:  94 %    Resp Rate (set):  10                Electronically Signed By: SAMMI Welch CRNA  August 27, 2020  4:26 PM

## 2020-08-27 NOTE — ANESTHESIA POSTPROCEDURE EVALUATION
Patient: Madelyn Jackson    Procedure(s):  CYSTOSCOPY, LEFT RETROGRADE URETEROPYELOGRAM, LEFT RIGID URETEROSCOPE, HOLMIUM LASER LITHOTRIPSY OF URETERAL CALCULUS, AND LEFT URETER STENT INSERTION    Diagnosis:Kidney stones [N20.0]  Diagnosis Additional Information: No value filed.    Anesthesia Type:  General    Note:  Anesthesia Post Evaluation    Patient location during evaluation: PACU  Patient participation: Able to fully participate in evaluation  Level of consciousness: awake and alert  Pain management: adequate  Airway patency: patent  Cardiovascular status: acceptable and stable  Respiratory status: acceptable, spontaneous ventilation, unassisted and nonlabored ventilation  Hydration status: acceptable  PONV: none       Comments: Patient required a neb treatment post op.         Last vitals:  Vitals:    08/27/20 1730 08/27/20 1740 08/27/20 1750   BP: 110/68 118/68 120/66   Pulse:      Resp: 15 21 14   Temp: 38.7  C (101.7  F) 38.6  C (101.5  F) 38.5  C (101.3  F)   SpO2: 95% 96% 97%         Electronically Signed By: Gonsalo Aj MD  August 27, 2020  6:00 PM

## 2020-08-27 NOTE — PLAN OF CARE
Arrived to OBS unit around 1200. A+OX4, Sinhala speaking. Patient's son present to assist with translation. Up independently. NPO for surgery. IV Dilaudid once for flank pain. COVID negative, precautions discontinued. Left unit around 1415 for surgery. All belongings sent with patient.

## 2020-08-27 NOTE — H&P
[facility]    History and Physical - Hospitalist Service       Date of Admission:  8/27/2020    Assessment & Plan   Madelyn Jackson is a 59 year old healthy male from [place] admitted on 8/27/2020 recurring bouts of flank pain and fevers starting approximately 2 months ago. His most recent episode started 2 days prior to admission with flank pain progressing to worsening pain and fever prompting his presentation.      At presentation his temperature is 100.2, heart rate 110, blood pressure 121/72, oxygenation 94% on room air.  Creatinine minimally elevated at 1.29 (no prior labs), BUN 20 Glucose 145.  White blood cell count hemoglobin normal with a platelet of 145. UA 29 WBC, 10 RBC, moderate bacteria. Large LE    CT of the abdomen and pelvis reveals 2 obstructing distal left ureteric calculi the largest measuring 9 mm at the UVJ with an additional 3 mm calculus 1 cm proximal.  He has mild to moderate left hydronephrosis.    Pyelonephritis with obstructing left ureteral stone, with moderate hydronephrosis  Possible HETAL (Cr 1.29, no baseline)     - Started on IV ceftriaxone in the ED. urine culture obtained.  Unfortunately, no blood cultures obtained.   - Due to high incidence of resistance to FQ will continue with ceftriaxone and increase dose to 2 grams daily.    - Urology consulted and saw patient in the ED.  I suspect he will be going to the OR later today.  - He will be n.p.o. with IV fluids ordered: Normal saline +20 of KCl at 100 cc/h.  He remained hemodynamically stable throughout his stay in the ED.  He does not appear septic.  -For pain control I have ordered oxycodone 5 to 10 mg every 3 hours as needed with IV Dilaudid 0.3 to 0.5 mg to be used for breakthrough or if unable to take p.o.    Random hyperglycemia - blood sugar 145 in ED  - Check A1c  - Every 4 hours blood sugars are ordered with as needed sliding scale insulin, low intensity    Incidental finding of patchy  groundglass opacities in the lower lung field on abdominal CT  - Patient has noted very minimal shortness of breath over the last month.  He denies any chest pain, cough, loss of taste or smell or other symptoms to suggest acute COVID.  His CT scan did show groundglass opacities.  And therefore symptomatic COVID test was obtained.  Oxygenation here has remained over 94% on RA.       Diet: NPO  DVT Prophylaxis: Pneumatic Compression Devices  Kohli Catheter: not present  Code Status: Full code.   Rule Out COVID-19 Handoff:  Madelyn is a LOW SUSPICION PUI.  Follow these instructions:    If COVID test positive -> continue isolation precautions    If COVID test negative -> discontinue COVID-specific isolation precautions      Disposition Plan   Expected discharge: 2 - 3 days, recommended to prior living arrangement once Stone is treated and the patient is no longer having fevers and we have urine culture results.  Given his potential for more severe illness I do feel we should have his urine culture results back prior to discharge.  Entered: Narcisa Romero MD 08/27/2020, 11:31 AM     The patient's care was discussed with the Patient and Patient's Family.    Narcisa Romero MD  [facility]    ______________________________________________________________________    Chief Complaint   Flank pain and fever     History is obtained from the patient and his son.     History of Present Illness   Madelyn Jackson is a 59 year old healthy male from [place] admitted on 8/27/2020 recurring bouts of flank pain and fevers starting approximately 2 months ago. His most recent episode started 2 days prior to admission with flank pain progressing to worsening pain and fever prompting his presentation.      At presentation his temperature is 100.2, heart rate 110, blood pressure 121/72, oxygenation 94% on room air.  Creatinine minimally elevated at 1.29 (no prior labs), BUN 20 Glucose 145.  White blood cell count  hemoglobin normal with a platelet of 145.     CT of the abdomen and pelvis reveals 2 obstructing distal left ureteric calculi the largest measuring 9 mm at the UVJ with an additional 3 mm calculus 1 cm proximal.  He has mild to moderate left hydronephrosis.    Patient reports for the last 2 months he has been having reoccurring episodes of flank pain sometimes on both sides this time just on the left side.  This will been present progressed to fevers and malaise.  He also notes nausea and vomiting with the pain during previous episodes.  No vomiting this time.  He actually noted that he passed a stone in his urine a month ago.  He also got tested for COVID a month ago when he was having fevers and this came back negative.      Incidentally chest CT shows patchy ground glass opacities visualized in the lower lungs, nonspecific, Of course COVID can have this appearance. Associated with above he did mention a feeling of just his heaviness in his lungs.  But denies chest pain or shortness of breath, loss of taste, smell, diarrhea.  He states it is very mild and he is not worried about it.  He denies any weight gain or swelling.      Review of Systems    The 10 point Review of Systems is negative other than noted in the HPI.     Past Medical History    Patient states he was treated for a potential stone in [place] approximately 15 years ago.  He is never had surgery for this.  He was given a medication at that time.  Skin cancer.    Past Surgical History   He denies any prior surgical procedures.      Social History   He denies smoking he very rarely drinks, just on special occasions.  He is not currently working.     Family History     He denies any known family history of kidney stones.    Prior to Admission Medications   None   He states he takes no medications regularly.    Allergies   No Known Allergies    Physical Exam   Vital Signs: Temp: 100.2  F (37.9  C) Temp src: Temporal BP: 111/73 Pulse: 66     SpO2: 94 % O2  Device: None (Room air)    Weight: 227 lbs 0 oz    Constitutional:   awake, alert, cooperative, no apparent distress, and appears stated age     Eyes:   Lids and lashes normal, pupils equal, round and reactive to light, extra ocular muscles intact, sclera clear, conjunctiva normal     ENT:   Normocephalic, without obvious abnormality, atramatic, wearing mask.      Neck:   Supple, symmetrical, trachea midline, no adenopathy, thyroid symmetric, not enlarged and no tenderness, skin normal     Lungs:   No increased work of breathing, good air exchange, clear to auscultation bilaterally, no crackles or wheezing     Cardiovascular:   Regular rate and rhythm, normal S1 and S2, no S3 or S4, and no murmur noted. Extremities are warm. No edema.      Abdomen:   Normal bowel sounds, soft, non-distended, non-tender, no masses palpated, no hepatosplenomegally. CVA tenderness on the left.      Musculoskeletal:   There is no redness, warmth, or swelling of the joints. Normal build.      Neurologic:   Awake, alert, oriented to name, place and time.  Cranial nerves II-XII are grossly intact.  Moving a 4 extremities without gross focal weakness.     Neuropsychiatric:   General: normal, calm and normal eye contact     Skin:   no bruising or bleeding, no redness, warmth, or swelling and no rashes       Data   Data reviewed today: I reviewed all medications, new labs and imaging results over the last 24 hours. I personally reviewed no images or EKG's today.    Recent Labs   Lab 08/27/20  0613   WBC 8.1   HGB 14.3   MCV 96   *      POTASSIUM 3.8   CHLORIDE 111*   CO2 25   BUN 20   CR 1.29*   ANIONGAP 4   JENNIFER 8.6   *     Recent Results (from the past 24 hour(s))   CT Abdomen Pelvis without Contrast (stone protocol)    Narrative    CT ABDOMEN PELVIS W/O CONTRAST 8/27/2020 7:10 AM    CLINICAL HISTORY: Flank pain, recurrent stone disease suspected  TECHNIQUE: CT scan of the abdomen and pelvis was performed without  IV  contrast. Multiplanar reformats were obtained. Dose reduction  techniques were used.  CONTRAST: None.    COMPARISON: None.    FINDINGS:   LOWER CHEST: There are some patchy groundglass opacities in the lower  lungs bilaterally. Calcified granuloma left lower lobe.    HEPATOBILIARY: Diffuse hepatic steatosis. Cholelithiasis.    PANCREAS: No acute peripancreatic inflammatory changes.    SPLEEN: Normal size.    ADRENAL GLANDS: Normal.    KIDNEYS/BLADDER: The left kidney is enlarged and edematous. There is a  9 mm calculus at the left ureterovesical junction and an additional 3  mm calculus 1 cm proximal to the UVJ stone. There is mild-moderate  left hydroureteronephrosis. Tiny nonobstructing right intrarenal  calculi. Left renal cyst which does not require further imaging  evaluation. The bladder is negative.    BOWEL: The distal esophagus and stomach are normal in appearance.  Multiple duodenal diverticulum. No small bowel obstruction. Normal  appendix. Normal appearance to the colon.    LYMPH NODES: No abdominopelvic lymphadenopathy.    VASCULATURE: Unremarkable.    PELVIC ORGANS: Normal.    OTHER: None.    MUSCULOSKELETAL: Scattered skeletal degenerative changes.  Fat-containing left internal hernia.      Impression    IMPRESSION:   1.  Two obstructing distal left ureteral calculi, the larger measuring  9 mm at the UVJ with additional 3 mm calculus 1 cm proximal.  2.  Mild-moderate left hydroureteronephrosis.  3.  Patchy groundglass opacities are visualized in the lower lungs.  Imaging features can be seen with (COVID-19)  pneumonia, though are  nonspecific and can occur with a variety of infectious and  noninfectious processes.    LEA CH MD   XR Chest Port 1 View    Narrative    CHEST ONE VIEW PORTABLE  8/27/2020 9:25 AM     HISTORY:  Fever.    COMPARISON: None.      Impression    IMPRESSION: Portable chest. Lungs are clear. Linear scarring right  midlung is noted. Heart is normal in size. No  pneumothorax. No  definite pleural effusions.    PATEL HARRINGTON MD

## 2020-08-27 NOTE — ANESTHESIA PREPROCEDURE EVALUATION
Anesthesia Pre-Procedure Evaluation    Patient: Madelyn Jackson   MRN: [id number] : 1960          Preoperative Diagnosis: Kidney stones [N20.0]    Procedure(s):  CYSTOSCOPY, WITH URETERAL STENT INSERTION    No past medical history on file.  No past surgical history on file.  No Known Allergies  Social History     Tobacco Use     Smoking status: Not on file   Substance Use Topics     Alcohol use: Not on file     Prior to Admission medications    Not on File     Current Facility-Administered Medications Ordered in Epic   Medication Dose Route Frequency Last Rate Last Dose     0.9% sodium chloride + KCl 20 mEq/L infusion   Intravenous Continuous 100 mL/hr at 20 1213       [Auto Hold] acetaminophen (TYLENOL) Suppository 650 mg  650 mg Rectal Q4H PRN         [Auto Hold] acetaminophen (TYLENOL) tablet 650 mg  650 mg Oral Q4H PRN         [Auto Hold] bisacodyl (DULCOLAX) Suppository 10 mg  10 mg Rectal Daily PRN         cefTRIAXone (ROCEPHIN) 1 g vial to attach to  mL bag for ADULTS or NS 50 mL bag for PEDS  1 g Intravenous Once   1 g at 20 1404     [Auto Hold] cefTRIAXone (ROCEPHIN) 2 g vial to attach to  ml bag for ADULTS or NS 50 ml bag for PEDS  2 g Intravenous Q24H         [Auto Hold] glucose gel 15-30 g  15-30 g Oral Q15 Min PRN        Or     [Auto Hold] dextrose 50 % injection 25-50 mL  25-50 mL Intravenous Q15 Min PRN        Or     [Auto Hold] glucagon injection 1 mg  1 mg Subcutaneous Q15 Min PRN         [Auto Hold] HYDROmorphone (PF) (DILAUDID) injection 0.3-0.5 mg  0.3-0.5 mg Intravenous Q2H PRN   0.3 mg at 20 1243     [Auto Hold] insulin aspart (NovoLOG) injection (RAPID ACTING)  1-4 Units Subcutaneous Q4H         [Auto Hold] melatonin tablet 1 mg  1 mg Oral At Bedtime PRN         [Auto Hold] naloxone (NARCAN) injection 0.1-0.4 mg  0.1-0.4 mg Intravenous Q2 Min PRN         [Auto Hold] ondansetron (ZOFRAN-ODT) ODT tab 4 mg  4 mg Oral Q6H PRN        Or     [Auto Hold]  ondansetron (ZOFRAN) injection 4 mg  4 mg Intravenous Q6H PRN         [Auto Hold] oxyCODONE (ROXICODONE) tablet 5-10 mg  5-10 mg Oral Q3H PRN         Patient RECEIVING antibiotic to treat a different condition and it provides ADEQUATE COVERAGE for this surgical procedure.  1 each As instructed Continuous         [Auto Hold] polyethylene glycol (MIRALAX) Packet 17 g  17 g Oral Daily PRN         [Auto Hold] potassium chloride (KLOR-CON) Packet 20-40 mEq  20-40 mEq Oral or Feeding Tube Q2H PRN         [Auto Hold] potassium chloride 10 mEq in 100 mL intermittent infusion with 10 mg lidocaine  10 mEq Intravenous Q1H PRN         [Auto Hold] potassium chloride 10 mEq in 100 mL sterile water intermittent infusion (premix)  10 mEq Intravenous Q1H PRN         [Auto Hold] potassium chloride 20 mEq in 50 mL intermittent infusion  20 mEq Intravenous Q1H PRN         [Auto Hold] potassium chloride ER (KLOR-CON M) CR tablet 20-40 mEq  20-40 mEq Oral Q2H PRN         [Auto Hold] senna-docusate (SENOKOT-S/PERICOLACE) 8.6-50 MG per tablet 1 tablet  1 tablet Oral BID PRN        Or     [Auto Hold] senna-docusate (SENOKOT-S/PERICOLACE) 8.6-50 MG per tablet 2 tablet  2 tablet Oral BID PRN         No current [facility]-ordered outpatient medications on file.       0.9% sodium chloride + KCl 20 mEq/L 100 mL/hr at 08/27/20 1213     Patient RECEIVING antibiotic to treat a different condition and it provides ADEQUATE COVERAGE for this surgical procedure.       Recent Labs   Lab Test 08/27/20  0613      POTASSIUM 3.8   CHLORIDE 111*   CO2 25   ANIONGAP 4   *   BUN 20   CR 1.29*   JENNIFER 8.6     Recent Labs   Lab Test 08/27/20  0613   WBC 8.1   HGB 14.3   *     No results for input(s): ABO, RH in the last 30383 hours.  No results for input(s): TROPI in the last 90117 hours.  No results for input(s): PH, PCO2, PO2, HCO3 in the last 20191 hours.  No results for input(s): HCG in the last 40591 hours.  Recent Results (from the past 744  hour(s))   CT Abdomen Pelvis without Contrast (stone protocol)    Narrative    CT ABDOMEN PELVIS W/O CONTRAST 8/27/2020 7:10 AM    CLINICAL HISTORY: Flank pain, recurrent stone disease suspected  TECHNIQUE: CT scan of the abdomen and pelvis was performed without IV  contrast. Multiplanar reformats were obtained. Dose reduction  techniques were used.  CONTRAST: None.    COMPARISON: None.    FINDINGS:   LOWER CHEST: There are some patchy groundglass opacities in the lower  lungs bilaterally. Calcified granuloma left lower lobe.    HEPATOBILIARY: Diffuse hepatic steatosis. Cholelithiasis.    PANCREAS: No acute peripancreatic inflammatory changes.    SPLEEN: Normal size.    ADRENAL GLANDS: Normal.    KIDNEYS/BLADDER: The left kidney is enlarged and edematous. There is a  9 mm calculus at the left ureterovesical junction and an additional 3  mm calculus 1 cm proximal to the UVJ stone. There is mild-moderate  left hydroureteronephrosis. Tiny nonobstructing right intrarenal  calculi. Left renal cyst which does not require further imaging  evaluation. The bladder is negative.    BOWEL: The distal esophagus and stomach are normal in appearance.  Multiple duodenal diverticulum. No small bowel obstruction. Normal  appendix. Normal appearance to the colon.    LYMPH NODES: No abdominopelvic lymphadenopathy.    VASCULATURE: Unremarkable.    PELVIC ORGANS: Normal.    OTHER: None.    MUSCULOSKELETAL: Scattered skeletal degenerative changes.  Fat-containing left internal hernia.      Impression    IMPRESSION:   1.  Two obstructing distal left ureteral calculi, the larger measuring  9 mm at the UVJ with additional 3 mm calculus 1 cm proximal.  2.  Mild-moderate left hydroureteronephrosis.  3.  Patchy groundglass opacities are visualized in the lower lungs.  Imaging features can be seen with (COVID-19)  pneumonia, though are  nonspecific and can occur with a variety of infectious and  noninfectious processes.    LEA CH MD    XR Chest Port 1 View    Narrative    CHEST ONE VIEW PORTABLE  8/27/2020 9:25 AM     HISTORY:  Fever.    COMPARISON: None.      Impression    IMPRESSION: Portable chest. Lungs are clear. Linear scarring right  midlung is noted. Heart is normal in size. No pneumothorax. No  definite pleural effusions.    PATEL HARRINGTON MD     No results found for this or any previous visit (from the past 4320 hour(s)).      RECENT LABS:       Anesthesia Evaluation     .             ROS/MED HX    ENT/Pulmonary:  - neg pulmonary ROS     Neurologic:  - neg neurologic ROS     Cardiovascular:  - neg cardiovascular ROS       METS/Exercise Tolerance:  >4 METS   Hematologic:  - neg hematologic  ROS       Musculoskeletal:  - neg musculoskeletal ROS       GI/Hepatic:     (+) Other GI/Hepatic (moderate nausea)       Renal/Genitourinary:     (+) chronic renal disease, Nephrolithiasis ,       Endo:  - neg endo ROS       Psychiatric:         Infectious Disease:         Malignancy:         Other:                          Physical Exam  Normal systems: dental    Airway   Mallampati: II  TM distance: >3 FB  Neck ROM: full    Dental     Cardiovascular   Rhythm and rate: regular and normal      Pulmonary    breath sounds clear to auscultation            Lab Results   Component Value Date    WBC 8.1 08/27/2020    HGB 14.3 08/27/2020    HCT 42.8 08/27/2020     (L) 08/27/2020     08/27/2020    POTASSIUM 3.8 08/27/2020    CHLORIDE 111 (H) 08/27/2020    CO2 25 08/27/2020    BUN 20 08/27/2020    CR 1.29 (H) 08/27/2020     (H) 08/27/2020    JENNIFER 8.6 08/27/2020       Preop Vitals  BP Readings from Last 3 Encounters:   08/27/20 (!) 144/69    Pulse Readings from Last 3 Encounters:   08/27/20 72      Resp Readings from Last 3 Encounters:   08/27/20 18    SpO2 Readings from Last 3 Encounters:   08/27/20 99%      Temp Readings from Last 1 Encounters:   08/27/20 36.3  C (97.3  F) (Oral)    Ht Readings from Last 1 Encounters:   08/27/20 1.798 m (5'  "10.8\")      Wt Readings from Last 1 Encounters:   08/27/20 103 kg (227 lb)    Estimated body mass index is 31.84 kg/m  as calculated from the following:    Height as of this encounter: 1.798 m (5' 10.8\").    Weight as of this encounter: 103 kg (227 lb).       Anesthesia Plan      History & Physical Review  History and physical reviewed and following examination; no interval change.    ASA Status:  2 .    NPO Status:  > 8 hours    Plan for General (ETT) with Intravenous and Propofol induction. Maintenance will be Balanced.    PONV prophylaxis:  Ondansetron (or other 5HT-3) and Dexamethasone or Solumedrol         Postoperative Care  Postoperative pain management:  IV analgesics.      Consents  Anesthetic plan, risks, benefits and alternatives discussed with:  Patient..                 Sebastian Savage MD  "

## 2020-08-27 NOTE — ED TRIAGE NOTES
Having trouble with flank pain off/on for past 2 months. Tonight having bilateral flank pain, worse on left with fever and urinary pain and frequency.

## 2020-08-27 NOTE — PROCEDURES
Urology Brief Operative Note    Pre-operative diagnosis: Left distal ureteral calculi     Post-operative diagnosis: Same   Procedure: Procedure(s):  CYSTOURETEROSCOPY, WITH RETROGRADE PYELOGRAM, HOLMIUM LASER LITHOTRIPSY OF URETERAL CALCULUS, AND STENT INSERTION Left ureter   Surgeon: Washington Echeverria MD   Assistant(s): None   Anesthesia: General mask   Estimated blood loss: None   Total IV fluids: (See anesthesia record)   Blood transfusion: No transfusion was given during surgery   Total urine output: (See anesthesia record)   Drains: None   Specimens: None   Implants: None   Findings: Stones in left distal ureter   Complications: None   Condition: Stable   Comments: See dictated operative report for full details

## 2020-08-28 VITALS
HEART RATE: 54 BPM | TEMPERATURE: 96.4 F | DIASTOLIC BLOOD PRESSURE: 51 MMHG | RESPIRATION RATE: 16 BRPM | WEIGHT: 227 LBS | OXYGEN SATURATION: 96 % | HEIGHT: 71 IN | SYSTOLIC BLOOD PRESSURE: 116 MMHG | BODY MASS INDEX: 31.78 KG/M2

## 2020-08-28 LAB
ANION GAP SERPL CALCULATED.3IONS-SCNC: 7 MMOL/L (ref 3–14)
BACTERIA SPEC CULT: ABNORMAL
BUN SERPL-MCNC: 18 MG/DL (ref 7–30)
CALCIUM SERPL-MCNC: 8.1 MG/DL (ref 8.5–10.1)
CHLORIDE SERPL-SCNC: 110 MMOL/L (ref 94–109)
CO2 SERPL-SCNC: 22 MMOL/L (ref 20–32)
CREAT SERPL-MCNC: 1.04 MG/DL (ref 0.66–1.25)
ERYTHROCYTE [DISTWIDTH] IN BLOOD BY AUTOMATED COUNT: 14 % (ref 10–15)
GFR SERPL CREATININE-BSD FRML MDRD: 78 ML/MIN/{1.73_M2}
GLUCOSE BLDC GLUCOMTR-MCNC: 153 MG/DL (ref 70–99)
GLUCOSE BLDC GLUCOMTR-MCNC: 174 MG/DL (ref 70–99)
GLUCOSE BLDC GLUCOMTR-MCNC: 175 MG/DL (ref 70–99)
GLUCOSE BLDC GLUCOMTR-MCNC: 184 MG/DL (ref 70–99)
GLUCOSE BLDC GLUCOMTR-MCNC: 204 MG/DL (ref 70–99)
GLUCOSE BLDC GLUCOMTR-MCNC: 204 MG/DL (ref 70–99)
GLUCOSE SERPL-MCNC: 218 MG/DL (ref 70–99)
HCT VFR BLD AUTO: 39.2 % (ref 40–53)
HGB BLD-MCNC: 13 G/DL (ref 13.3–17.7)
Lab: ABNORMAL
MCH RBC QN AUTO: 32.3 PG (ref 26.5–33)
MCHC RBC AUTO-ENTMCNC: 33.2 G/DL (ref 31.5–36.5)
MCV RBC AUTO: 97 FL (ref 78–100)
PLATELET # BLD AUTO: 136 10E9/L (ref 150–450)
POTASSIUM SERPL-SCNC: 3.9 MMOL/L (ref 3.4–5.3)
RBC # BLD AUTO: 4.03 10E12/L (ref 4.4–5.9)
SODIUM SERPL-SCNC: 139 MMOL/L (ref 133–144)
SPECIMEN SOURCE: ABNORMAL
WBC # BLD AUTO: 10.8 10E9/L (ref 4–11)

## 2020-08-28 PROCEDURE — 36415 COLL VENOUS BLD VENIPUNCTURE: CPT | Performed by: INTERNAL MEDICINE

## 2020-08-28 PROCEDURE — 25000132 ZZH RX MED GY IP 250 OP 250 PS 637: Performed by: INTERNAL MEDICINE

## 2020-08-28 PROCEDURE — 00000146 ZZHCL STATISTIC GLUCOSE BY METER IP

## 2020-08-28 PROCEDURE — 85027 COMPLETE CBC AUTOMATED: CPT | Performed by: INTERNAL MEDICINE

## 2020-08-28 PROCEDURE — 80048 BASIC METABOLIC PNL TOTAL CA: CPT | Performed by: INTERNAL MEDICINE

## 2020-08-28 PROCEDURE — 99239 HOSP IP/OBS DSCHRG MGMT >30: CPT | Performed by: STUDENT IN AN ORGANIZED HEALTH CARE EDUCATION/TRAINING PROGRAM

## 2020-08-28 PROCEDURE — 25000128 H RX IP 250 OP 636: Performed by: INTERNAL MEDICINE

## 2020-08-28 PROCEDURE — 25000132 ZZH RX MED GY IP 250 OP 250 PS 637: Performed by: STUDENT IN AN ORGANIZED HEALTH CARE EDUCATION/TRAINING PROGRAM

## 2020-08-28 RX ORDER — CEFUROXIME AXETIL 500 MG/1
500 TABLET ORAL 2 TIMES DAILY
Qty: 28 TABLET | Refills: 0 | Status: SHIPPED | OUTPATIENT
Start: 2020-08-28 | End: 2020-09-11

## 2020-08-28 RX ORDER — OXYCODONE HYDROCHLORIDE 5 MG/1
5 TABLET ORAL EVERY 6 HOURS PRN
Qty: 12 TABLET | Refills: 0 | Status: SHIPPED | OUTPATIENT
Start: 2020-08-28 | End: 2020-08-31

## 2020-08-28 RX ADMIN — PIPERACILLIN SODIUM AND TAZOBACTAM SODIUM 4.5 G: 4; .5 INJECTION, POWDER, LYOPHILIZED, FOR SOLUTION INTRAVENOUS at 06:25

## 2020-08-28 RX ADMIN — PIPERACILLIN SODIUM AND TAZOBACTAM SODIUM 4.5 G: 4; .5 INJECTION, POWDER, LYOPHILIZED, FOR SOLUTION INTRAVENOUS at 12:43

## 2020-08-28 RX ADMIN — Medication 1 LOZENGE: at 05:16

## 2020-08-28 RX ADMIN — POTASSIUM CHLORIDE AND SODIUM CHLORIDE: 900; 150 INJECTION, SOLUTION INTRAVENOUS at 06:51

## 2020-08-28 RX ADMIN — INSULIN ASPART 1 UNITS: 100 INJECTION, SOLUTION INTRAVENOUS; SUBCUTANEOUS at 12:47

## 2020-08-28 RX ADMIN — INSULIN ASPART 1 UNITS: 100 INJECTION, SOLUTION INTRAVENOUS; SUBCUTANEOUS at 05:17

## 2020-08-28 RX ADMIN — PIPERACILLIN SODIUM AND TAZOBACTAM SODIUM 4.5 G: 4; .5 INJECTION, POWDER, LYOPHILIZED, FOR SOLUTION INTRAVENOUS at 00:18

## 2020-08-28 RX ADMIN — INSULIN ASPART 1 UNITS: 100 INJECTION, SOLUTION INTRAVENOUS; SUBCUTANEOUS at 09:13

## 2020-08-28 RX ADMIN — ACETAMINOPHEN 650 MG: 325 TABLET, FILM COATED ORAL at 09:31

## 2020-08-28 RX ADMIN — INSULIN ASPART 1 UNITS: 100 INJECTION, SOLUTION INTRAVENOUS; SUBCUTANEOUS at 01:00

## 2020-08-28 RX ADMIN — Medication 1 LOZENGE: at 10:08

## 2020-08-28 ASSESSMENT — ACTIVITIES OF DAILY LIVING (ADL)
ADLS_ACUITY_SCORE: 17

## 2020-08-28 NOTE — PROGRESS NOTES
PUI. Pt arrived from PACU at around 1840hrs. Slovenian speaking with very little English. Denies pain. VSS on o2 1L nc. Urinating per urinal. Urology consulted. Likely to be retested for COVID within 48hrs.

## 2020-08-28 NOTE — PROGRESS NOTES
Pt is A&OX4. Covid negative. Micronesian speaking. Blood glucose check and Reg Diet. Afebrile, VSS on RA. Indpt to bathroom. Mild discomfort of abdomen. Denies pain medication. Continue to monitor.

## 2020-08-28 NOTE — UTILIZATION REVIEW
Admission Status; Secondary Review Determination       Under the authority of the Utilization Management Committee, the utilization review process indicated a secondary review on the above patient. The review outcome is based on review of the medical records, discussions with staff, and applying clinical experience noted on the date of the review.     (x) Inpatient Status Appropriate - This patient's medical care is consistent with medical management for inpatient care and reasonable inpatient medical practice.     RATIONALE FOR DETERMINATION   59 y.o man POD#1 cysto, ureteroscopy, holmium laser lithotripsy, left ureteral stent placement.  Doing well. UCX with >100K gram neg rods, final result pending.  Patient also being re-tested for COVID given respiratory symptoms and imaging findings (PPE worn for encounter: gown, N95, face shield for eye protection, gloves). Pyelonephritis with obstructing left ureteral stone, with moderate hydronephrosis. Possible HETAL  The expected length of stay at the time of admission was more than 2 nights because of the severity of illness, intensity of service provided, and risk for adverse outcome. Inpatient admission is appropriate.     This document was produced using voice recognition software       The information on this document is developed by the utilization review team in order for the business office to ensure compliance. This only denotes the appropriateness of proper admission status and does not reflect the quality of care rendered.   The definitions of Inpatient Status and Observation Status used in making the determination above are those provided in the CMS Coverage Manual, Chapter 1 and Chapter 6, section 70.4.   Sincerely,   SAMANTHA RIZVI MD   System Medical Director   Utilization Management   [facility].

## 2020-08-28 NOTE — PHARMACY-ADMISSION MEDICATION HISTORY
Pharmacy Medication History  Admission medication history interview status for the 8/27/2020  admission is complete. See EPIC admission navigator for prior to admission medications     Medication history sources: Patient's family/friend (son Willie)  Medication history source reliability: Good  Adherence assessment: n/a    Additional medication history information:   Patient is not taking any medications at this time.     Medication reconciliation completed by provider prior to medication history? No    Time spent in this activity: 15 minutes       Prior to Admission medications    Not on File     Mary Becerril, PharmD  [phone]

## 2020-08-28 NOTE — PLAN OF CARE
Discharge instructions discussed and questions answered. Discharge medications sent home with patient. Pt's daughter picking patient up at door 2.

## 2020-08-28 NOTE — PROGRESS NOTES
[facility] brief follow up note:    Was asked by hospitalist to follow up with pt post OR/PACU after fever and respiratory distress were noted.  Review of chart notes CXR unremarkable.  Pt's temp downtrending, now 101.  Pt currently on 2L O2 with O2 sats high 90s, RR low 20s.  Nursing notes auscultated wheezes; however, no audible wheezes noted.  Nursing notes no use of accessory muscles with inspiration, appears comfortable at this time resting in bed.  Per recommendations by hospitalist, will continue current antibiotic regimen, defer lasix administration and continue with COVID 19 precautions.  Will add on BNP in setting of ongoing auscultated wheezes.  No smoking history.  Pt to remain on continuous pulse oximetry.      Addendum: 2020: BNP and lactic acid WNL.       SAMMI Cool, CNP  [place] [facility]    No charge.

## 2020-08-28 NOTE — PROGRESS NOTES
UROLOGY PROGRESS NOTE    August 28, 2020  Post-op Day # 1    SUBJECTIVE:  Patient seen with Armenian interpretation assistance from SUZI Hurd.  Patient feeling well overall.  Reports some discomfort near the bladder, primarily at the end of his voids.  Denies any flank pain.  Noticed a couple small flecks of brown-black material in the urine this morning as well as some hematuria with his first void post-op, but hematuria improved now.  Denies N/V.    AVSS (last febrile at 1800 yesterday with T 101.1)  800/400cc UOP    OBJECTIVE:  Temp:  [95.7  F (35.4  C)-105.7  F (40.9  C)] 96.1  F (35.6  C)  Pulse:  [] 63  Resp:  [14-27] 24  BP: ()/(60-91) 92/61  SpO2:  [93 %-99 %] 99 %    Intake/Output Summary (Last 24 hours) at 8/28/2020 1024  Last data filed at 8/28/2020 0107  Gross per 24 hour   Intake 1660 ml   Output 800 ml   Net 860 ml       GENERAL:  Awake, alert, no acute distress, resting in bed  HEAD: Normocephalic atraumatic  SCLERA: Anicteric  EXTREMITIES: Warm and well perfused  ABDOMEN:  Soft, mildly tender in suprapubic region, o/w non-tender, non-distended. No guarding, rigidity, or peritoneal signs.   : No flank tenderness    LABS:  Lab Results   Component Value Date    WBC 8.1 08/27/2020     Lab Results   Component Value Date    HGB 14.3 08/27/2020     Lab Results   Component Value Date    HCT 42.8 08/27/2020     Lab Results   Component Value Date     08/27/2020     Last Basic Metabolic Panel:  Lab Results   Component Value Date     08/27/2020      Lab Results   Component Value Date    POTASSIUM 3.8 08/27/2020     Lab Results   Component Value Date    CHLORIDE 111 08/27/2020     Lab Results   Component Value Date    JENNIFER 8.6 08/27/2020     Lab Results   Component Value Date    CO2 25 08/27/2020     Lab Results   Component Value Date    BUN 20 08/27/2020     Lab Results   Component Value Date    CR 1.29 08/27/2020     Lab Results   Component Value Date     08/27/2020        ASSESSMENT/PLAN: 59 y.o man POD#1 cysto, ureteroscopy, holmium laser lithotripsy, left ureteral stent placement.  Doing well. UCX with >100K gram neg rods, final result pending.  Patient also being re-tested for COVID given respiratory symptoms and imaging findings (PPE worn for encounter: gown, N95, face shield for eye protection, gloves).    1. OK for regular diet from urology standpoint  2. Pain control per primary team  3. Continue abx per IM-- awaiting final urine culture results  4. Discussed stent symptoms and expectations with patient with RN Vannessa lewis.  Discussed that while stent is in place, patient may experience flank pain, bladder spasms, hematuria, urinary frequency or urgency and that these symptoms typically resolve once the stent is removed.  Provided patient education materials on ureteral stents in Latvian.  Emphasized that stents are designed to be temporary and recommendation is for him to have the stent removed in urology clinic in 10-14 days.  Our office will contact him to schedule this (he requests that we call his son to make arrangements and gives permission to speak to son).  5. Dispo: OK for discharge from urology standpoint once abx plan in place.  Follow-up in 10-14 days for ureteral stent removal.  AVS updated.    Addm: Discussed with Dr. Echeverria at 1015.      Devi Cole PA-C  [facility]  Office Phone: [phone]  After 4pm and on weekends, please call [phone]

## 2020-08-28 NOTE — DISCHARGE SUMMARY
[facility]  Hospitalist Discharge Summary      Date of Admission:  8/27/2020  Date of Discharge:  8/28/2020  Discharging Provider: Abimael Hay MD      Discharge Diagnoses     Severe left renal colic secondary to a large left distal ureteral calculus   Acute Pyelonephritis    Follow-ups Needed After Discharge   Follow-up Appointments     Follow Up ([facility])      Follow up with urology in 10-14 days for stent removal.  Our office will   call you to schedule an appointment.  If you do not hear from us within 3   business days of hospital discharge, please call [phone] to schedule   an appointment.    [facility]  [address] [place] [address]  You may call [phone] with any questions or concerns.       You have a URETERAL STENT.  This is a temporary tube that will help your   kidney drain appropriately after your procedure.    Most people notice three symptoms when they have a stent:    1)  Bladder spasms  - You may feel the urge to urinate frequently while the stent is in  - You may have occasional bladder spasms where you feel pain in the   bladder area   - You may have been prescribed a medicine like Detrol or Ditropan to help   with these symptoms    2) Blood in the urine  - Usually this is light pink   - If you see blood in the urine, rest and drink plenty of fluids    3) Pain in the kidney area  - This usually happens right after urination and goes away pretty quickly  - You can take tylenol for the pain     These symptoms are temporary and will usually improve quickly once the   stent can be removed.    IMPORTANT:  Stents are designed to be temporary.  You will need to make   arrangements with your doctor to have it removed.         Follow-up and recommended labs and tests       Follow up with primary care provider, Physician No Ref-Primary, within 7   days for hospital follow- up.  The following labs/tests are recommended:   BMP.            Unresulted Labs Ordered in the Past 30 Days of this Admission     Date and Time Order Name Status Description    8/27/2020 0810 Urine Culture Aerobic Bacterial Preliminary         Discharge Disposition   Discharged to home  Condition at discharge: Stable    Hospital Course     Madelyn Jackson is a 59 year old healthy male from [place] admitted on 8/27/2020 recurring bouts of flank pain and fevers starting approximately 2 months ago. His most recent episode started 2 days prior to admission with flank pain progressing to worsening pain and fever prompting his presentation.       At presentation his temperature is 100.2, heart rate 110, blood pressure 121/72, oxygenation 94% on room air.  Creatinine minimally elevated at 1.29 (no prior labs), BUN 20 Glucose 145.  White blood cell count hemoglobin normal with a platelet of 145. UA 29 WBC, 10 RBC, moderate bacteria. Large LE     CT of the abdomen and pelvis reveals 2 obstructing distal left ureteric calculi the largest measuring 9 mm at the UVJ with an additional 3 mm calculus 1 cm proximal.  He has mild to moderate left hydronephrosis.     Pyelonephritis with obstructing left ureteral stone, with moderate hydronephrosis  Possible HETAL (Cr 1.29, no baseline)      - Started on IV ceftriaxone in the ED. urine culture obtained -> Klebsiella   - Urology consulted   ->   1.  Cystoscopy.   2.  Left retrograde ureteropyelogram.   3.  Left semirigid ureteroscopy with holmium laser lithotripsy.   4.  Left double-J stent placement (4.7 Icelandic x 28 cm).     - Urology plans to have the stent removed in urology clinic in 10-14 days     Random hyperglycemia - blood sugar 145 in ED  - Follow as outpatient    Incidental finding of patchy groundglass opacities in the lower lung field on abdominal CT  - Patient has noted very minimal shortness of breath over the last month.  He denies any chest pain, cough, loss of taste or smell or other symptoms to suggest acute COVID.  His CT  scan did show groundglass opacities.  And therefore symptomatic COVID test was obtained.  Oxygenation here has remained over 94% on RA.          Consultations This Hospital Stay   UROLOGY IP CONSULT    Code Status   Full Code    Time Spent on this Encounter   I, Abimael Hay MD, personally saw the patient today and spent greater than 30 minutes discharging this patient.       Abimael Hay MD  [facility]  ______________________________________________________________________    Physical Exam   Vital Signs: Temp: 96.4  F (35.8  C) Temp src: Oral BP: 116/51 Pulse: 54   Resp: 16 SpO2: 96 % O2 Device: None (Room air) Oxygen Delivery: 1 LPM  Weight: 227 lbs 0 oz    Primary Care Physician   Physician No Ref-Primary    Discharge Orders      Follow Up ([facility])    Follow up with urology in 10-14 days for stent removal.  Our office will call you to schedule an appointment.  If you do not hear from us within 3 business days of hospital discharge, please call [phone] to schedule an appointment.    [facility]  [address] [place] [address]  You may call [phone] with any questions or concerns.       You have a URETERAL STENT.  This is a temporary tube that will help your kidney drain appropriately after your procedure.    Most people notice three symptoms when they have a stent:    1)  Bladder spasms  - You may feel the urge to urinate frequently while the stent is in  - You may have occasional bladder spasms where you feel pain in the bladder area   - You may have been prescribed a medicine like Detrol or Ditropan to help with these symptoms    2) Blood in the urine  - Usually this is light pink   - If you see blood in the urine, rest and drink plenty of fluids    3) Pain in the kidney area  - This usually happens right after urination and goes away pretty quickly  - You can take tylenol for the pain     These symptoms are temporary and will usually  improve quickly once the stent can be removed.    IMPORTANT:  Stents are designed to be temporary.  You will need to make arrangements with your doctor to have it removed.     Reason for your hospital stay    You had flank pain from a kidney infection. You were started on antibiotics and evaluated by our urology specialty team.     Follow-up and recommended labs and tests     Follow up with primary care provider, Physician No Ref-Primary, within 7 days for hospital follow- up.  The following labs/tests are recommended: BMP.     Activity    Your activity upon discharge: activity as tolerated     Diet    Follow this diet upon discharge: Orders Placed This Encounter      Regular Diet Adult       Significant Results and Procedures   Most Recent 3 CBC's:  Recent Labs   Lab Test 08/28/20  1043 08/27/20  0613   WBC 10.8 8.1   HGB 13.0* 14.3   MCV 97 96   * 145*     Most Recent 3 BMP's:  Recent Labs   Lab Test 08/28/20  1043 08/27/20  0613    140   POTASSIUM 3.9 3.8   CHLORIDE 110* 111*   CO2 22 25   BUN 18 20   CR 1.04 1.29*   ANIONGAP 7 4   JENNIFER 8.1* 8.6   * 145*     Most Recent 2 LFT's:No lab results found.  Most Recent 3 INR's:No lab results found.  Most Recent 6 Bacteria Isolates From Any Culture (See EPIC Reports for Culture Details):  Recent Labs   Lab Test 08/27/20  0810   CULT >100,000 colonies/mL  Klebsiella pneumoniae  Susceptibility testing in progress  *     Most Recent Urinalysis:  Recent Labs   Lab Test 08/27/20  0810   COLOR Yellow   APPEARANCE Clear   URINEGLC Negative   URINEBILI Negative   URINEKETONE Negative   SG 1.024   UBLD Small*   URINEPH 5.5   PROTEIN 10*   NITRITE Negative   LEUKEST Large*   RBCU 10*   WBCU 29*   ,   Results for orders placed or performed during the hospital encounter of 08/27/20   CT Abdomen Pelvis without Contrast (stone protocol)    Narrative    CT ABDOMEN PELVIS W/O CONTRAST 8/27/2020 7:10 AM    CLINICAL HISTORY: Flank pain, recurrent stone disease  suspected  TECHNIQUE: CT scan of the abdomen and pelvis was performed without IV  contrast. Multiplanar reformats were obtained. Dose reduction  techniques were used.  CONTRAST: None.    COMPARISON: None.    FINDINGS:   LOWER CHEST: There are some patchy groundglass opacities in the lower  lungs bilaterally. Calcified granuloma left lower lobe.    HEPATOBILIARY: Diffuse hepatic steatosis. Cholelithiasis.    PANCREAS: No acute peripancreatic inflammatory changes.    SPLEEN: Normal size.    ADRENAL GLANDS: Normal.    KIDNEYS/BLADDER: The left kidney is enlarged and edematous. There is a  9 mm calculus at the left ureterovesical junction and an additional 3  mm calculus 1 cm proximal to the UVJ stone. There is mild-moderate  left hydroureteronephrosis. Tiny nonobstructing right intrarenal  calculi. Left renal cyst which does not require further imaging  evaluation. The bladder is negative.    BOWEL: The distal esophagus and stomach are normal in appearance.  Multiple duodenal diverticulum. No small bowel obstruction. Normal  appendix. Normal appearance to the colon.    LYMPH NODES: No abdominopelvic lymphadenopathy.    VASCULATURE: Unremarkable.    PELVIC ORGANS: Normal.    OTHER: None.    MUSCULOSKELETAL: Scattered skeletal degenerative changes.  Fat-containing left internal hernia.      Impression    IMPRESSION:   1.  Two obstructing distal left ureteral calculi, the larger measuring  9 mm at the UVJ with additional 3 mm calculus 1 cm proximal.  2.  Mild-moderate left hydroureteronephrosis.  3.  Patchy groundglass opacities are visualized in the lower lungs.  Imaging features can be seen with (COVID-19)  pneumonia, though are  nonspecific and can occur with a variety of infectious and  noninfectious processes.    LEA CH MD   XR Chest Port 1 View    Narrative    CHEST ONE VIEW PORTABLE  8/27/2020 9:25 AM     HISTORY:  Fever.    COMPARISON: None.      Impression    IMPRESSION: Portable chest. Lungs are clear.  Linear scarring right  midlung is noted. Heart is normal in size. No pneumothorax. No  definite pleural effusions.    PATEL HARRINGTON MD   XR Surgery MARCELA Fluoro L/T 5 Min w Stills    Narrative    XR SURGERY C-ARM FLUOROSCOPY LESS THAN FIVE MINUTES WITH STILLS   8/27/2020 3:53 PM     HISTORY: Left stent placement with retrograde.     44 seconds fluoroscopy. Three images.    COMPARISON: None.      Impression    IMPRESSION: Three spot fluoroscopic images obtained with contrast  partially visible in the left ureter. Ureteral stent in position by  completion. Total fluoroscopic time is 0.8 minutes.    BINA ROMERO MD   XR Chest Port 1 View    Narrative    CHEST ONE VIEW  8/27/2020 5:40 PM     HISTORY: Expiratory wheezes, congested cough, diaphragmatic breathing.    COMPARISON: August 27, 2020      Impression    IMPRESSION: No acute disease.    SHOBHA SIMONS MD       Discharge Medications   Current Discharge Medication List      START taking these medications    Details   cefuroxime (CEFTIN) 500 MG tablet Take 1 tablet (500 mg) by mouth 2 times daily for 14 days  Qty: 28 tablet, Refills: 0    Associated Diagnoses: Pyelonephritis      oxyCODONE (ROXICODONE) 5 MG tablet Take 1 tablet (5 mg) by mouth every 6 hours as needed for pain  Qty: 12 tablet, Refills: 0    Associated Diagnoses: Pyelonephritis           Allergies   No Known Allergies

## 2020-08-28 NOTE — OP NOTE
Procedure Date: 08/27/2020      PREOPERATIVE DIAGNOSIS:  Severe left renal colic secondary to a large left distal ureteral calculus (9 mm and 3 mm).      POSTOPERATIVE DIAGNOSIS:  Severe left renal colic secondary to a large left distal ureteral calculus (9 mm and 3 mm).      PROCEDURES:   1.  Cystoscopy.   2.  Left retrograde ureteropyelogram.   3.  Left semirigid ureteroscopy with holmium laser lithotripsy.   4.  Left double-J stent placement (4.7 Nigerian x 28 cm).      ANESTHESIA:  General.      BLOOD LOSS:  Zero.      PROCEDURE NOTE:  The patient was prepped and draped in the dorsal lithotomy position under satisfactory general anesthetic with IV antibiotics given within the past hour and a half (broad spectrum cephalosporin).  A multidisciplinary timeout was observed.      I passed a 22-Nigerian cystoscope per meatus and into the bladder.  There was no evidence of vesical calculi in the bladder.  The ureteral orifices were orthotopic.  However, the in left ureteral orifice, an intramural tunnel was markedly distended and you obviously could tell a stone was in the intramural tunnel based on the cystoscopic findings.      I shot a retrograde ureteropyelogram, very gentle, and marked dilation of the ureter and hydronephrosis was noted above the stones.  The ureteral orifice was not particularly stenotic; in fact, it was of fair size, so I thought I could perform a gentle semirigid ureteroscopy and, if possible, break the stones up.  I inserted a stiff Glidewire through a 6-Nigerian tapered ureteral catheter and met little resistance going into the intramural tunnel and then into the upper collecting system on the patient's left side.      Over the guidewire, I passed a short Stortz semirigid ureteroscope and encountered a 9 mm brownish-black multiple spiculated stone in the intramural tunnel.  I used a 365 micron laser fiber, took a separate laser timeout, and then broke the stone into approximately 45-50 fragments;  the largest fragment was less than 1 mm in size.  I then advanced the scope and found the 3 mm fragment just a short distance up the ureter and broke that into about 15-20 pieces.      I then removed the ureteroscope and placed a 4.7 Amharic x 28 cm double-J stent into the left collecting system after backloading the wire, which I replaced after ureteroscopy, and then removed the ureteroscope.  The wire was then placed through the cystoscope and the stent placed with a full curl in the patient's upper tract, and a separate more distal curl in the patient's bladder.      The patient will come to our office in the next 10 days to 2 weeks for removal of the stent.  He will need to be on antibiotics for at least a couple of days until the urine cultures are back.  The patient tolerated the procedure very well and left the operating room in stable condition.         BETTY CHENG MD             D: 2020   T: 2020   MT: JEET      Name:     VIOLETTA PAZ   MRN:      [id number]        Account:        [id number]   :      1960           Procedure Date: 2020      Document: [id number]       cc: Narcisa Romero MD

## 2020-08-28 NOTE — PLAN OF CARE
Covid neg on 8/27, possibly retest in 48 hours. VSS on RA, soft Bps. A/Ox4. Ind. Thai speaking but able to communicate needs. Jaber in room. Denies pain, N/V, SOB. Pt states that they have pain when voiding. Uses urinal and bathroom, output good. NS w/ KCL running @ 100. Received Zosyn 2x. Q4 hr BG and Q4hr scheduled insulin. Has a urology consult today. Continue to monitor.

## (undated) DEVICE — PACK CYSTOSCOPY SBA15CYFSI

## (undated) DEVICE — CATH URETERAL DUAL LUMEN 10FRX54CM M0064051000

## (undated) DEVICE — GUIDEWIRE ZIPWIRE STR STIFF .035"X150CM M006630222B0

## (undated) DEVICE — SYR 50ML CATH TIP W/O NDL 309620

## (undated) DEVICE — CATH URETERAL OPEN END 6FR AXXCESS

## (undated) DEVICE — LASER FIBER HOLMIUM FLEXIVA 365UM M0068403920

## (undated) DEVICE — RAD RX ISOVUE 300 (50ML) 61% IOPAMIDOL CHARGE PER ML

## (undated) DEVICE — BAG CYSTO TABLE DRAIN

## (undated) DEVICE — GLOVE PROTEXIS W/NEU-THERA 7.5  2D73TE75

## (undated) DEVICE — PAD CHUX UNDERPAD 23X24" 7136

## (undated) RX ORDER — NEOSTIGMINE METHYLSULFATE 1 MG/ML
VIAL (ML) INJECTION
Status: DISPENSED
Start: 2020-08-27

## (undated) RX ORDER — DEXAMETHASONE SODIUM PHOSPHATE 4 MG/ML
INJECTION, SOLUTION INTRA-ARTICULAR; INTRALESIONAL; INTRAMUSCULAR; INTRAVENOUS; SOFT TISSUE
Status: DISPENSED
Start: 2020-08-27

## (undated) RX ORDER — ATROPA BELLADONNA AND OPIUM 16.2; 3 MG/1; MG/1
SUPPOSITORY RECTAL
Status: DISPENSED
Start: 2020-08-27

## (undated) RX ORDER — PROPOFOL 10 MG/ML
INJECTION, EMULSION INTRAVENOUS
Status: DISPENSED
Start: 2020-08-27

## (undated) RX ORDER — ACETAMINOPHEN 650 MG/1
SUPPOSITORY RECTAL
Status: DISPENSED
Start: 2020-08-27

## (undated) RX ORDER — FENTANYL CITRATE 50 UG/ML
INJECTION, SOLUTION INTRAMUSCULAR; INTRAVENOUS
Status: DISPENSED
Start: 2020-08-27

## (undated) RX ORDER — GLYCOPYRROLATE 0.2 MG/ML
INJECTION, SOLUTION INTRAMUSCULAR; INTRAVENOUS
Status: DISPENSED
Start: 2020-08-27

## (undated) RX ORDER — IPRATROPIUM BROMIDE AND ALBUTEROL SULFATE 2.5; .5 MG/3ML; MG/3ML
SOLUTION RESPIRATORY (INHALATION)
Status: DISPENSED
Start: 2020-08-27

## (undated) RX ORDER — ONDANSETRON 2 MG/ML
INJECTION INTRAMUSCULAR; INTRAVENOUS
Status: DISPENSED
Start: 2020-08-27